# Patient Record
Sex: MALE | Race: WHITE | NOT HISPANIC OR LATINO | Employment: FULL TIME | URBAN - METROPOLITAN AREA
[De-identification: names, ages, dates, MRNs, and addresses within clinical notes are randomized per-mention and may not be internally consistent; named-entity substitution may affect disease eponyms.]

---

## 2021-05-20 ENCOUNTER — APPOINTMENT (EMERGENCY)
Dept: RADIOLOGY | Facility: HOSPITAL | Age: 41
End: 2021-05-20

## 2021-05-20 ENCOUNTER — HOSPITAL ENCOUNTER (EMERGENCY)
Facility: HOSPITAL | Age: 41
Discharge: HOME | End: 2021-05-20
Attending: EMERGENCY MEDICINE | Admitting: EMERGENCY MEDICINE

## 2021-05-20 VITALS
RESPIRATION RATE: 18 BRPM | HEART RATE: 64 BPM | SYSTOLIC BLOOD PRESSURE: 139 MMHG | DIASTOLIC BLOOD PRESSURE: 85 MMHG | OXYGEN SATURATION: 95 %

## 2021-05-20 DIAGNOSIS — S22.31XA CLOSED FRACTURE OF ONE RIB OF RIGHT SIDE, INITIAL ENCOUNTER: Primary | ICD-10-CM

## 2021-05-20 LAB
ALBUMIN SERPL BCP-MCNC: 4 G/DL (ref 3.5–5)
ALP SERPL-CCNC: 57 U/L (ref 46–116)
ALT SERPL W P-5'-P-CCNC: 24 U/L (ref 12–78)
ANION GAP SERPL CALCULATED.3IONS-SCNC: 10 MMOL/L (ref 4–13)
AST SERPL W P-5'-P-CCNC: 13 U/L (ref 5–45)
BASOPHILS # BLD AUTO: 0.03 THOUSANDS/ΜL (ref 0–0.1)
BASOPHILS NFR BLD AUTO: 1 % (ref 0–1)
BILIRUB SERPL-MCNC: 0.54 MG/DL (ref 0.2–1)
BUN SERPL-MCNC: 9 MG/DL (ref 5–25)
CALCIUM SERPL-MCNC: 9.4 MG/DL (ref 8.3–10.1)
CHLORIDE SERPL-SCNC: 108 MMOL/L (ref 100–108)
CO2 SERPL-SCNC: 23 MMOL/L (ref 21–32)
CREAT SERPL-MCNC: 0.89 MG/DL (ref 0.6–1.3)
EOSINOPHIL # BLD AUTO: 0.14 THOUSAND/ΜL (ref 0–0.61)
EOSINOPHIL NFR BLD AUTO: 2 % (ref 0–6)
ERYTHROCYTE [DISTWIDTH] IN BLOOD BY AUTOMATED COUNT: 12.5 % (ref 11.6–15.1)
GFR SERPL CREATININE-BSD FRML MDRD: 107 ML/MIN/1.73SQ M
GLUCOSE SERPL-MCNC: 98 MG/DL (ref 65–140)
HCT VFR BLD AUTO: 47.8 % (ref 36.5–49.3)
HGB BLD-MCNC: 16.5 G/DL (ref 12–17)
IMM GRANULOCYTES # BLD AUTO: 0.01 THOUSAND/UL (ref 0–0.2)
IMM GRANULOCYTES NFR BLD AUTO: 0 % (ref 0–2)
LYMPHOCYTES # BLD AUTO: 1.7 THOUSANDS/ΜL (ref 0.6–4.47)
LYMPHOCYTES NFR BLD AUTO: 26 % (ref 14–44)
MCH RBC QN AUTO: 33.3 PG (ref 26.8–34.3)
MCHC RBC AUTO-ENTMCNC: 34.5 G/DL (ref 31.4–37.4)
MCV RBC AUTO: 97 FL (ref 82–98)
MONOCYTES # BLD AUTO: 0.78 THOUSAND/ΜL (ref 0.17–1.22)
MONOCYTES NFR BLD AUTO: 12 % (ref 4–12)
NEUTROPHILS # BLD AUTO: 3.82 THOUSANDS/ΜL (ref 1.85–7.62)
NEUTS SEG NFR BLD AUTO: 59 % (ref 43–75)
NRBC BLD AUTO-RTO: 0 /100 WBCS
PLATELET # BLD AUTO: 157 THOUSANDS/UL (ref 149–390)
PMV BLD AUTO: 11.1 FL (ref 8.9–12.7)
POTASSIUM SERPL-SCNC: 3.9 MMOL/L (ref 3.5–5.3)
PROT SERPL-MCNC: 7.8 G/DL (ref 6.4–8.2)
RBC # BLD AUTO: 4.95 MILLION/UL (ref 3.88–5.62)
SODIUM SERPL-SCNC: 141 MMOL/L (ref 136–145)
WBC # BLD AUTO: 6.48 THOUSAND/UL (ref 4.31–10.16)

## 2021-05-20 PROCEDURE — 36415 COLL VENOUS BLD VENIPUNCTURE: CPT | Performed by: PHYSICIAN ASSISTANT

## 2021-05-20 PROCEDURE — 71250 CT THORAX DX C-: CPT

## 2021-05-20 PROCEDURE — 99284 EMERGENCY DEPT VISIT MOD MDM: CPT

## 2021-05-20 PROCEDURE — G1004 CDSM NDSC: HCPCS

## 2021-05-20 PROCEDURE — 99284 EMERGENCY DEPT VISIT MOD MDM: CPT | Performed by: PHYSICIAN ASSISTANT

## 2021-05-20 PROCEDURE — 96374 THER/PROPH/DIAG INJ IV PUSH: CPT

## 2021-05-20 PROCEDURE — 85025 COMPLETE CBC W/AUTO DIFF WBC: CPT | Performed by: PHYSICIAN ASSISTANT

## 2021-05-20 PROCEDURE — 80053 COMPREHEN METABOLIC PANEL: CPT | Performed by: PHYSICIAN ASSISTANT

## 2021-05-20 RX ORDER — OXYCODONE HYDROCHLORIDE 5 MG/1
5 TABLET ORAL ONCE
Status: COMPLETED | OUTPATIENT
Start: 2021-05-20 | End: 2021-05-20

## 2021-05-20 RX ORDER — LIDOCAINE 50 MG/G
1 PATCH TOPICAL DAILY
Qty: 30 PATCH | Refills: 0 | Status: SHIPPED | OUTPATIENT
Start: 2021-05-20

## 2021-05-20 RX ORDER — KETOROLAC TROMETHAMINE 30 MG/ML
15 INJECTION, SOLUTION INTRAMUSCULAR; INTRAVENOUS ONCE
Status: COMPLETED | OUTPATIENT
Start: 2021-05-20 | End: 2021-05-20

## 2021-05-20 RX ORDER — ACETAMINOPHEN 325 MG/1
975 TABLET ORAL ONCE
Status: COMPLETED | OUTPATIENT
Start: 2021-05-20 | End: 2021-05-20

## 2021-05-20 RX ORDER — OXYCODONE HYDROCHLORIDE 5 MG/1
5 TABLET ORAL EVERY 6 HOURS PRN
Qty: 8 TABLET | Refills: 0 | Status: SHIPPED | OUTPATIENT
Start: 2021-05-20

## 2021-05-20 RX ORDER — METHOCARBAMOL 750 MG/1
750 TABLET, FILM COATED ORAL ONCE
Status: COMPLETED | OUTPATIENT
Start: 2021-05-20 | End: 2021-05-20

## 2021-05-20 RX ORDER — LIDOCAINE 50 MG/G
1 PATCH TOPICAL ONCE
Status: DISCONTINUED | OUTPATIENT
Start: 2021-05-20 | End: 2021-05-20 | Stop reason: HOSPADM

## 2021-05-20 RX ADMIN — ACETAMINOPHEN 975 MG: 325 TABLET, FILM COATED ORAL at 10:46

## 2021-05-20 RX ADMIN — OXYCODONE HYDROCHLORIDE 5 MG: 5 TABLET ORAL at 11:28

## 2021-05-20 RX ADMIN — KETOROLAC TROMETHAMINE 15 MG: 30 INJECTION, SOLUTION INTRAMUSCULAR at 09:51

## 2021-05-20 RX ADMIN — METHOCARBAMOL TABLETS 750 MG: 750 TABLET, COATED ORAL at 09:52

## 2021-05-20 RX ADMIN — LIDOCAINE 1 PATCH: 50 PATCH TOPICAL at 09:54

## 2021-05-20 NOTE — ED PROVIDER NOTES
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 and felt pain in his right ribs  States that this morning he started coughing again and pain continued and intensified  36 y o  male presents for evaluation of right sided rib pain, has been present for 3 days worse this morning  Notes he works as a  was coughing and felt pain in his ribs  Has been tolerable with tylenol then this morning suffered another coughing fit and felt a pop and has worsening rib pain  Denies SOB, CP, HA, blurry vision, injury, trauma, sick contacts, or known covid exposures  + smoker, +COPD, + marijuana use          Prior to Admission Medications   Prescriptions Last Dose Informant Patient Reported? Taking? Albuterol Sulfate 108 (90 Base) MCG/ACT AEPB   Yes Yes   Sig: Inhale 1 puff every 2 (two) hours as needed      Facility-Administered Medications: None       Past Medical History:   Diagnosis Date    COPD (chronic obstructive pulmonary disease) ([facility] [address] )        History reviewed  No pertinent surgical history  History reviewed  No pertinent family history  I have reviewed and agree with the history as documented  E-Cigarette/Vaping    E-Cigarette Use Never User      E-Cigarette/Vaping Substances     Social History     Tobacco Use    Smoking status: Current Every Day Smoker    Smokeless tobacco: Never Used   Substance Use Topics    Alcohol use: Yes     Frequency: 4 or more times a week     Drinks per session: 5 or 6     Binge frequency: Daily or almost daily    Drug use: Yes     Types: Marijuana       Review of Systems   Respiratory: Positive for cough  Musculoskeletal: Positive for back pain  All other systems reviewed and are negative  Physical Exam  Physical Exam  Vitals signs and nursing note reviewed  Constitutional:       Appearance: Normal appearance  He is normal weight     HENT:      Head: Normocephalic and atraumatic  Right Ear: External ear normal       Left Ear: External ear normal       Nose: Nose normal       Mouth/Throat:      Mouth: Mucous membranes are moist       Pharynx: Oropharynx is clear  Eyes:      Extraocular Movements: Extraocular movements intact  Conjunctiva/sclera: Conjunctivae normal       Pupils: Pupils are equal, round, and reactive to light  Neck:      Musculoskeletal: Normal range of motion and neck supple  Cardiovascular:      Rate and Rhythm: Normal rate and regular rhythm  Pulses: Normal pulses  Heart sounds: Normal heart sounds  Pulmonary:      Effort: Pulmonary effort is normal       Breath sounds: Decreased air movement present  Examination of the right-lower field reveals decreased breath sounds  Examination of the left-lower field reveals decreased breath sounds  Decreased breath sounds present  Musculoskeletal: Normal range of motion  General: Tenderness present  Arms:    Skin:     General: Skin is warm  Capillary Refill: Capillary refill takes less than 2 seconds  Neurological:      General: No focal deficit present  Mental Status: He is alert and oriented to person, place, and time  Mental status is at baseline  Psychiatric:         Mood and Affect: Mood normal          Thought Content:  Thought content normal          Judgment: Judgment normal          Vital Signs  ED Triage Vitals [05/20/21 0931]   Temp Pulse Respirations Blood Pressure SpO2   -- 64 18 139/85 95 %      Temp src Heart Rate Source Patient Position - Orthostatic VS BP Location FiO2 (%)   -- Monitor Sitting Right arm --      Pain Score       Worst Possible Pain           Vitals:    05/20/21 0931   BP: 139/85   Pulse: 64   Patient Position - Orthostatic VS: Sitting         Visual Acuity      ED Medications  Medications   ketorolac (TORADOL) injection 15 mg (15 mg Intravenous Given 5/20/21 0951)   methocarbamol (ROBAXIN) tablet 750 mg (750 mg Oral Given 5/20/21 0952)   acetaminophen (TYLENOL) tablet 975 mg (975 mg Oral Given 5/20/21 1046)   oxyCODONE (ROXICODONE) IR tablet 5 mg (5 mg Oral Given 5/20/21 1128)       Diagnostic Studies  Results Reviewed     Procedure Component Value Units Date/Time    Comprehensive metabolic panel [[id number]] Collected: 05/20/21 0947    Lab Status: Final result Specimen: Blood from Arm, Right Updated: 05/20/21 1029     Sodium 141 mmol/L      Potassium 3 9 mmol/L      Chloride 108 mmol/L      CO2 23 mmol/L      ANION GAP 10 mmol/L      BUN 9 mg/dL      Creatinine 0 89 mg/dL      Glucose 98 mg/dL      Calcium 9 4 mg/dL      AST 13 U/L      ALT 24 U/L      Alkaline Phosphatase 57 U/L      Total Protein 7 8 g/dL      Albumin 4 0 g/dL      Total Bilirubin 0 54 mg/dL      eGFR 107 ml/min/1 73sq m     Narrative:      National Kidney Disease Foundation guidelines for Chronic Kidney Disease (CKD):     Stage 1 with normal or high GFR (GFR > 90 mL/min/1 73 square meters)    Stage 2 Mild CKD (GFR = 60-89 mL/min/1 73 square meters)    Stage 3A Moderate CKD (GFR = 45-59 mL/min/1 73 square meters)    Stage 3B Moderate CKD (GFR = 30-44 mL/min/1 73 square meters)    Stage 4 Severe CKD (GFR = 15-29 mL/min/1 73 square meters)    Stage 5 End Stage CKD (GFR <15 mL/min/1 73 square meters)  Note: GFR calculation is accurate only with a steady state creatinine    CBC and differential [[id number]] Collected: 05/20/21 0947    Lab Status: Final result Specimen: Blood from Arm, Right Updated: 05/20/21 0956     WBC 6 48 Thousand/uL      RBC 4 95 Million/uL      Hemoglobin 16 5 g/dL      Hematocrit 47 8 %      MCV 97 fL      MCH 33 3 pg      MCHC 34 5 g/dL      RDW 12 5 %      MPV 11 1 fL      Platelets 306 Thousands/uL      nRBC 0 /100 WBCs      Neutrophils Relative 59 %      Immat GRANS % 0 %      Lymphocytes Relative 26 %      Monocytes Relative 12 %      Eosinophils Relative 2 %      Basophils Relative 1 %      Neutrophils Absolute 3 82 Thousands/µL Immature Grans Absolute 0 01 Thousand/uL      Lymphocytes Absolute 1 70 Thousands/µL      Monocytes Absolute 0 78 Thousand/µL      Eosinophils Absolute 0 14 Thousand/µL      Basophils Absolute 0 03 Thousands/µL                  CT chest without contrast   Final Result by Nicky Curran MD (05/20 1102)      1  Acute nondisplaced fracture of lateral right 6th rib  Healing subacute fracture of posterolateral right 7th rib  2   No acute airspace disease  Mild biapical emphysema  The study was marked in [facility] for immediate notification  Workstation performed: [id number]                    Procedures  Procedures         ED Course                             SBIRT 20yo+      Most Recent Value   SBIRT (24 yo +)   In order to provide better care to our patients, we are screening all of our patients for alcohol and drug use  Would it be okay to ask you these screening questions? No Filed at: 05/20/2021 2081                    MDM    Disposition  Final diagnoses:   Closed fracture of one rib of right side, initial encounter     Time reflects when diagnosis was documented in both MDM as applicable and the Disposition within this note     Time User Action Codes Description Comment    5/20/2021 11:14 AM Nitesh Portillo Add [S22 31XA] Closed fracture of one rib of right side, initial encounter       ED Disposition     ED Disposition Condition Date/Time Comment    Discharge Stable Thu May 20, 2021 11:16 AM Todd Pitt discharge to home/self care              Follow-up Information     Follow up With Specialties Details Why Contact Info    [employer]    [phone]            Discharge Medication List as of 5/20/2021 11:21 AM      START taking these medications    Details   lidocaine (LIDODERM) 5 % Apply 1 patch topically daily Remove & Discard patch within 12 hours or as directed by MD, Starting Thu 5/20/2021, Normal      oxyCODONE (ROXICODONE) 5 mg immediate release tablet Take 1 tablet (5 mg total) by mouth every 6 (six) hours as needed for moderate pain or severe pain for up to 8 dosesMax Daily Amount: 20 mg, Starting Thu 5/20/2021, Normal         CONTINUE these medications which have NOT CHANGED    Details   Albuterol Sulfate 108 (90 Base) MCG/ACT AEPB Inhale 1 puff every 2 (two) hours as needed, Starting Tue 3/9/2021, Historical Med           No discharge procedures on file      PDMP Review     None          ED Provider  Electronically Signed by           Robert Mascorro PA-C  05/20/21 9303

## 2021-05-20 NOTE — ED NOTES
Pt returned from CT scan and continues to c/o pain to right rb cage   Awaiting ct scan results      Siddharth Rodrigues RN  05/20/21 3553

## 2021-05-20 NOTE — Clinical Note
Renetta Andrew was seen and treated in our emergency department on 5/20/2021  No work until cleared by Family Doctor/Orthopedics        Diagnosis:     Jermain Nichols    He may return on this date: If you have any questions or concerns, please don't hesitate to call        Ric Cline PA-C    ______________________________           _______________          _______________  Hospital Representative                              Date                                Time

## 2022-04-07 ENCOUNTER — OFFICE VISIT (OUTPATIENT)
Dept: FAMILY MEDICINE CLINIC | Facility: CLINIC | Age: 42
End: 2022-04-07

## 2022-04-07 VITALS
WEIGHT: 162.6 LBS | DIASTOLIC BLOOD PRESSURE: 80 MMHG | HEART RATE: 71 BPM | RESPIRATION RATE: 19 BRPM | OXYGEN SATURATION: 92 % | SYSTOLIC BLOOD PRESSURE: 128 MMHG | HEIGHT: 71 IN | BODY MASS INDEX: 22.76 KG/M2 | TEMPERATURE: 98 F

## 2022-04-07 DIAGNOSIS — R79.81 LOW OXYGEN SATURATION: ICD-10-CM

## 2022-04-07 DIAGNOSIS — J45.30 MILD PERSISTENT ASTHMA WITHOUT COMPLICATION: ICD-10-CM

## 2022-04-07 DIAGNOSIS — Z11.4 ENCOUNTER FOR SCREENING FOR HIV: ICD-10-CM

## 2022-04-07 DIAGNOSIS — K21.00 GASTROESOPHAGEAL REFLUX DISEASE WITH ESOPHAGITIS WITHOUT HEMORRHAGE: ICD-10-CM

## 2022-04-07 DIAGNOSIS — L93.0 DISCOID LUPUS: Primary | ICD-10-CM

## 2022-04-07 DIAGNOSIS — Z72.0 TOBACCO USE: ICD-10-CM

## 2022-04-07 DIAGNOSIS — Z11.3 ROUTINE SCREENING FOR STI (SEXUALLY TRANSMITTED INFECTION): ICD-10-CM

## 2022-04-07 DIAGNOSIS — Z11.59 ENCOUNTER FOR HEPATITIS C SCREENING TEST FOR LOW RISK PATIENT: ICD-10-CM

## 2022-04-07 PROCEDURE — 99204 OFFICE O/P NEW MOD 45 MIN: CPT | Performed by: NURSE PRACTITIONER

## 2022-04-07 RX ORDER — TRIAMCINOLONE ACETONIDE 1 MG/G
CREAM TOPICAL 2 TIMES DAILY
Qty: 30 G | Refills: 0 | Status: SHIPPED | OUTPATIENT
Start: 2022-04-07

## 2022-04-07 NOTE — ASSESSMENT & PLAN NOTE
Reports diagnosis at [facility] but has not followed up with Rheumatology or Dermatology  Reports using steroid cream, unsure of name    Start triamcinolone BID, referral to Dermatology

## 2022-04-07 NOTE — PATIENT INSTRUCTIONS
Asthma   WHAT YOU NEED TO KNOW:   Asthma is a lung disease that makes breathing difficult  Chronic inflammation and reactions to triggers narrow the airways in the lungs  Asthma can become life-threatening if it is not managed  DISCHARGE INSTRUCTIONS:   Call your local emergency number (911 in the [address]) if:   · You have severe shortness of breath  · The skin around your neck and ribs pulls in with each breath  · Your peak flow numbers are in the red zone of your AAP  Return to the emergency department if:   · You have shortness of breath, even after you take your short-term medicine as directed  · Your lips or nails turn blue or gray  Call your doctor or asthma specialist if:   · You run out of medicine before your next refill is due  · Your symptoms get worse  · You need to take more medicine than usual to control your symptoms  · You have questions or concerns about your condition or care  Medicines:   · Medicines  may be used to decrease inflammation, open airways, and make it easier to breathe  Medicines may be inhaled, taken as a pill, or injected  Short-term medicines relieve your symptoms quickly  Long-term medicines are used to prevent future asthma attacks  Other medicines may be needed if your regular medicines are not able to prevent attacks  You may also need medicine to help control your allergies  Ask your healthcare provider for more information about any medicine you are given and how to take it safely  · Take your medicine as directed  Contact your healthcare provider if you think your medicine is not helping or if you have side effects  Tell him of her if you are allergic to any medicine  Keep a list of the medicines, vitamins, and herbs you take  Include the amounts, and when and why you take them  Bring the list or the pill bottles to follow-up visits  Carry your medicine list with you in case of an emergency      Manage your symptoms and prevent future attacks: · Follow your Asthma Action Plan (AAP)  This is a written plan that you and your healthcare provider create  It explains which medicine you need and when to change doses if necessary  It also explains how you can monitor symptoms and use a peak flow meter  The meter measures how well your lungs are working  · Manage other health conditions , such as allergies, acid reflux, and sleep apnea  · Identify and avoid triggers  These may include pets, dust mites, mold, and cockroaches  · Do not smoke or be around others who smoke  Nicotine and other chemicals in cigarettes and cigars can cause lung damage  Ask your healthcare provider for information if you currently smoke and need help to quit  E-cigarettes or smokeless tobacco still contain nicotine  Talk to your healthcare provider before you use these products  · Ask about the flu vaccine  The flu can make your asthma worse  You may need a yearly flu shot  Follow up with your healthcare provider as directed: You will need to return to make sure your medicine is working and your symptoms are controlled  You may be referred to an asthma or allergy specialist  Justice Yazmin may be asked to keep a record of your peak flow values and bring it with you to your appointments  Write down your questions so you remember to ask them during your visits  © Copyright [employer] 2022 Information is for End User's use only and may not be sold, redistributed or otherwise used for commercial purposes  All illustrations and images included in CareNotes® are the copyrighted property of A D A M , Inc  or Nick Lopez   The above information is an  only  It is not intended as medical advice for individual conditions or treatments  Talk to your doctor, nurse or pharmacist before following any medical regimen to see if it is safe and effective for you

## 2022-04-07 NOTE — PROGRESS NOTES
Assessment/Plan:    Mild persistent asthma without complication  Reports increased SOB, not using inhalers currently  Low O2 saturation in office 92% RA  Start Advair BID, albuterol PRN  Chest X-ray  Educated to rinse mouth after each use of Advair  Advised to call office if Advair not covered by insurance  Discoid lupus  Reports diagnosis at [facility] but has not followed up with Rheumatology or Dermatology  Reports using steroid cream, unsure of name  Start triamcinolone BID, referral to Dermatology    Willa Chicas was seen today for new patient visit  Diagnoses and all orders for this visit:    Discoid lupus  -     CBC and differential; Future  -     Comprehensive metabolic panel; Future  -     Hemoglobin A1C; Future  -     Lipid panel; Future  -     TSH, 3rd generation with Free T4 reflex; Future  -     Ambulatory Referral to Dermatology; Future  -     triamcinolone (KENALOG) 0 1 % cream; Apply topically 2 (two) times a day  -     RF Screen w/ Reflex to Titer; Future  -     PIPPA Screen w/ Reflex to Titer/Pattern; Future  -     Lupus Anticoagulant Panel; Future  -     Sjogren's Antibodies; Future    Mild persistent asthma without complication  -     fluticasone-salmeterol (Advair Diskus) 100-50 mcg/dose inhaler; Inhale 1 puff 2 (two) times a day Rinse mouth after use  -     Albuterol Sulfate 108 (90 Base) MCG/ACT AEPB; Inhale 1 puff every 2 (two) hours as needed (wheezing)    Low oxygen saturation  -     XR chest pa & lateral; Future    Gastroesophageal reflux disease with esophagitis without hemorrhage  -     Ambulatory Referral to Gastroenterology; Future    Routine screening for STI (sexually transmitted infection)  -     RPR; Future  -     Chlamydia/GC amplified DNA by PCR;  Future    Encounter for screening for HIV  -     HIV 1/2 Antigen/Antibody (4th Generation) w Reflex [facility]; Future    Encounter for hepatitis C screening test for low risk patient  -     Hepatitis C antibody; Future    Tobacco use        Return in about 3 months (around 7/7/2022)  Patient Instructions     Asthma   WHAT YOU NEED TO KNOW:   Asthma is a lung disease that makes breathing difficult  Chronic inflammation and reactions to triggers narrow the airways in the lungs  Asthma can become life-threatening if it is not managed  DISCHARGE INSTRUCTIONS:   Call your local emergency number (911 in the [address]) if:   · You have severe shortness of breath  · The skin around your neck and ribs pulls in with each breath  · Your peak flow numbers are in the red zone of your AAP  Return to the emergency department if:   · You have shortness of breath, even after you take your short-term medicine as directed  · Your lips or nails turn blue or gray  Call your doctor or asthma specialist if:   · You run out of medicine before your next refill is due  · Your symptoms get worse  · You need to take more medicine than usual to control your symptoms  · You have questions or concerns about your condition or care  Medicines:   · Medicines  may be used to decrease inflammation, open airways, and make it easier to breathe  Medicines may be inhaled, taken as a pill, or injected  Short-term medicines relieve your symptoms quickly  Long-term medicines are used to prevent future asthma attacks  Other medicines may be needed if your regular medicines are not able to prevent attacks  You may also need medicine to help control your allergies  Ask your healthcare provider for more information about any medicine you are given and how to take it safely  · Take your medicine as directed  Contact your healthcare provider if you think your medicine is not helping or if you have side effects  Tell him of her if you are allergic to any medicine  Keep a list of the medicines, vitamins, and herbs you take  Include the amounts, and when and why you take them  Bring the list or the pill bottles to follow-up visits   Carry your medicine list with you in case of an emergency  Manage your symptoms and prevent future attacks:   · Follow your Asthma Action Plan (AAP)  This is a written plan that you and your healthcare provider create  It explains which medicine you need and when to change doses if necessary  It also explains how you can monitor symptoms and use a peak flow meter  The meter measures how well your lungs are working  · Manage other health conditions , such as allergies, acid reflux, and sleep apnea  · Identify and avoid triggers  These may include pets, dust mites, mold, and cockroaches  · Do not smoke or be around others who smoke  Nicotine and other chemicals in cigarettes and cigars can cause lung damage  Ask your healthcare provider for information if you currently smoke and need help to quit  E-cigarettes or smokeless tobacco still contain nicotine  Talk to your healthcare provider before you use these products  · Ask about the flu vaccine  The flu can make your asthma worse  You may need a yearly flu shot  Follow up with your healthcare provider as directed: You will need to return to make sure your medicine is working and your symptoms are controlled  You may be referred to an asthma or allergy specialist  Addison White may be asked to keep a record of your peak flow values and bring it with you to your appointments  Write down your questions so you remember to ask them during your visits  © Copyright [employer] 2022 Information is for End User's use only and may not be sold, redistributed or otherwise used for commercial purposes  All illustrations and images included in CareNotes® are the copyrighted property of [employer] A M , Inc  or Nick Lopez   The above information is an  only  It is not intended as medical advice for individual conditions or treatments   Talk to your doctor, nurse or pharmacist before following any medical regimen to see if it is safe and effective for you             Subjective:     Dante Amin is a 39 y o  male who  has a past medical history of COPD (chronic obstructive pulmonary disease) ([facility] [address] )  who presented to the office today to establish care  Patient is frustrated today because he thought that he was coming to a dermatology appointment today  He reports that he has a hx of discoid lupus and mild asthma  Uses tobacco, alcohol, declines drugs  The following portions of the patient's history were reviewed and updated as appropriate: allergies, current medications, past family history, past medical history, past social history, past surgical history and problem list     Current Outpatient Medications on File Prior to Visit   Medication Sig Dispense Refill    lidocaine (LIDODERM) 5 % Apply 1 patch topically daily Remove & Discard patch within 12 hours or as directed by MD 30 patch 0    oxyCODONE (ROXICODONE) 5 mg immediate release tablet Take 1 tablet (5 mg total) by mouth every 6 (six) hours as needed for moderate pain or severe pain for up to 8 dosesMax Daily Amount: 20 mg (Patient not taking: Reported on 4/7/2022 ) 8 tablet 0    [DISCONTINUED] Albuterol Sulfate 108 (90 Base) MCG/ACT AEPB Inhale 1 puff every 2 (two) hours as needed (Patient not taking: Reported on 4/7/2022 )       No current facility-administered medications on file prior to visit  Review of Systems   Constitutional: Negative for chills and fever  HENT: Negative for ear pain and sore throat  Eyes: Negative for pain and visual disturbance  Respiratory: Negative for cough and shortness of breath  Cardiovascular: Negative for chest pain and palpitations  Gastrointestinal: Negative for abdominal pain and vomiting  Genitourinary: Negative for dysuria and hematuria  Musculoskeletal: Negative for arthralgias, back pain and myalgias  Skin: Positive for rash  Negative for color change  Neurological: Negative for seizures and syncope     All other systems reviewed and are negative  Depression Screening and Follow-up Plan: Patient was screened for depression during today's encounter  They screened negative with a PHQ-2 score of 0  Tobacco Cessation Counseling: Tobacco cessation counseling was provided  The patient is sincerely urged to quit consumption of tobacco  He is not ready to quit tobacco         Objective:    /80 (BP Location: Left arm, Patient Position: Sitting, Cuff Size: Standard)   Pulse 71   Temp 98 °F (36 7 °C) (Temporal)   Resp 19   Ht 5' 11" (1 803 m)   Wt 73 8 kg (162 lb 9 6 oz)   SpO2 92%   BMI 22 68 kg/m²     Physical Exam  Vitals and nursing note reviewed  Constitutional:       General: He is not in acute distress  Appearance: He is well-developed  He is not diaphoretic  HENT:      Head: Normocephalic and atraumatic  Right Ear: External ear normal       Left Ear: External ear normal    Eyes:      Pupils: Pupils are equal, round, and reactive to light  Cardiovascular:      Rate and Rhythm: Normal rate and regular rhythm  Pulmonary:      Effort: Pulmonary effort is normal  No respiratory distress  Breath sounds: Decreased breath sounds present  No wheezing  Abdominal:      General: Bowel sounds are normal  There is no distension  Palpations: Abdomen is soft  Tenderness: There is no abdominal tenderness  There is no guarding or rebound  Musculoskeletal:         General: No deformity  Normal range of motion  Cervical back: Normal range of motion and neck supple  Lymphadenopathy:      Cervical: No cervical adenopathy  Skin:     General: Skin is warm and dry  Capillary Refill: Capillary refill takes less than 2 seconds  Findings: Rash present  Rash is scaling  Rash is not crusting  Neurological:      Mental Status: He is alert and oriented to person, place, and time        Deep Tendon Reflexes: Reflexes normal    Psychiatric:         Behavior: Behavior normal          Nasreen Rhodes ALYSSA  04/07/22  5:05 PM

## 2022-04-07 NOTE — ASSESSMENT & PLAN NOTE
Reports increased SOB, not using inhalers currently  Low O2 saturation in office 92% RA  Start Advair BID, albuterol PRN  Chest X-ray  Educated to rinse mouth after each use of Advair  Advised to call office if Advair not covered by insurance

## 2022-04-14 ENCOUNTER — TELEPHONE (OUTPATIENT)
Dept: DERMATOLOGY | Facility: CLINIC | Age: 42
End: 2022-04-14

## 2022-04-14 NOTE — TELEPHONE ENCOUNTER
Pt left v/m wanting to schedule a new pt appt, pt has referral in chart, called 3x and all 3x phone would ring and the call would get  dropped

## 2022-04-26 ENCOUNTER — TELEPHONE (OUTPATIENT)
Dept: FAMILY MEDICINE CLINIC | Facility: CLINIC | Age: 42
End: 2022-04-26

## 2022-04-26 DIAGNOSIS — J45.30 MILD PERSISTENT ASTHMA WITHOUT COMPLICATION: Primary | ICD-10-CM

## 2022-04-26 RX ORDER — ALBUTEROL SULFATE 90 UG/1
2 AEROSOL, METERED RESPIRATORY (INHALATION) EVERY 4 HOURS PRN
Qty: 18 G | Refills: 1 | Status: SHIPPED | OUTPATIENT
Start: 2022-04-26 | End: 2022-04-26 | Stop reason: SDUPTHER

## 2022-04-26 RX ORDER — ALBUTEROL SULFATE 90 UG/1
2 AEROSOL, METERED RESPIRATORY (INHALATION) EVERY 4 HOURS PRN
Qty: 18 G | Refills: 1 | Status: SHIPPED | OUTPATIENT
Start: 2022-04-26

## 2022-04-26 NOTE — TELEPHONE ENCOUNTER
Pt wife called states that the pharmacy told her that Premkimi Delaney advair is not covered and they can not afford it out of pocket and wants to know if there is another inhaler that insurance will cover      Please advise

## 2022-04-26 NOTE — TELEPHONE ENCOUNTER
Per pharmacy, pt does not have insurance  She said they just need a regular Albuterol inhaler sent as the respi-click one is brand and very expensive  Spoke to pt, he states he has an Rx card  I told him to take that card to the pharmacy and have them check on the Advair coverage  I did explain to him that without insurance, it won't matter what alternative we prescribe for the Advair, they all cost hundreds of dollars      If he ends up not having any prescription coverage, will need to refer him to the financial counselors/SW team

## 2022-07-30 ENCOUNTER — APPOINTMENT (EMERGENCY)
Dept: RADIOLOGY | Facility: HOSPITAL | Age: 42
End: 2022-07-30
Payer: COMMERCIAL

## 2022-07-30 ENCOUNTER — HOSPITAL ENCOUNTER (EMERGENCY)
Facility: HOSPITAL | Age: 42
Discharge: HOME | End: 2022-07-30
Attending: EMERGENCY MEDICINE
Payer: COMMERCIAL

## 2022-07-30 VITALS
RESPIRATION RATE: 18 BRPM | OXYGEN SATURATION: 97 % | TEMPERATURE: 98 F | HEART RATE: 67 BPM | DIASTOLIC BLOOD PRESSURE: 68 MMHG | SYSTOLIC BLOOD PRESSURE: 130 MMHG

## 2022-07-30 DIAGNOSIS — J44.1 COPD EXACERBATION (HCC): Primary | ICD-10-CM

## 2022-07-30 LAB
2HR DELTA HS TROPONIN: -2 NG/L
ALBUMIN SERPL BCP-MCNC: 3.8 G/DL (ref 3.5–5)
ALP SERPL-CCNC: 54 U/L (ref 46–116)
ALT SERPL W P-5'-P-CCNC: 20 U/L (ref 12–78)
ANION GAP SERPL CALCULATED.3IONS-SCNC: 3 MMOL/L (ref 4–13)
AST SERPL W P-5'-P-CCNC: 15 U/L (ref 5–45)
BASOPHILS # BLD AUTO: 0.04 THOUSANDS/ΜL (ref 0–0.1)
BASOPHILS NFR BLD AUTO: 1 % (ref 0–1)
BILIRUB SERPL-MCNC: 0.38 MG/DL (ref 0.2–1)
BUN SERPL-MCNC: 8 MG/DL (ref 5–25)
CALCIUM SERPL-MCNC: 9 MG/DL (ref 8.3–10.1)
CARDIAC TROPONIN I PNL SERPL HS: 3 NG/L
CARDIAC TROPONIN I PNL SERPL HS: 5 NG/L
CHLORIDE SERPL-SCNC: 114 MMOL/L (ref 96–108)
CO2 SERPL-SCNC: 26 MMOL/L (ref 21–32)
CREAT SERPL-MCNC: 0.93 MG/DL (ref 0.6–1.3)
D DIMER PPP FEU-MCNC: <0.27 UG/ML FEU
EOSINOPHIL # BLD AUTO: 0.37 THOUSAND/ΜL (ref 0–0.61)
EOSINOPHIL NFR BLD AUTO: 7 % (ref 0–6)
ERYTHROCYTE [DISTWIDTH] IN BLOOD BY AUTOMATED COUNT: 12.6 % (ref 11.6–15.1)
GFR SERPL CREATININE-BSD FRML MDRD: 101 ML/MIN/1.73SQ M
GLUCOSE SERPL-MCNC: 96 MG/DL (ref 65–140)
HCT VFR BLD AUTO: 45.1 % (ref 36.5–49.3)
HGB BLD-MCNC: 15.4 G/DL (ref 12–17)
IMM GRANULOCYTES # BLD AUTO: 0.01 THOUSAND/UL (ref 0–0.2)
IMM GRANULOCYTES NFR BLD AUTO: 0 % (ref 0–2)
LYMPHOCYTES # BLD AUTO: 2.43 THOUSANDS/ΜL (ref 0.6–4.47)
LYMPHOCYTES NFR BLD AUTO: 48 % (ref 14–44)
MCH RBC QN AUTO: 33 PG (ref 26.8–34.3)
MCHC RBC AUTO-ENTMCNC: 34.1 G/DL (ref 31.4–37.4)
MCV RBC AUTO: 97 FL (ref 82–98)
MONOCYTES # BLD AUTO: 0.57 THOUSAND/ΜL (ref 0.17–1.22)
MONOCYTES NFR BLD AUTO: 11 % (ref 4–12)
NEUTROPHILS # BLD AUTO: 1.67 THOUSANDS/ΜL (ref 1.85–7.62)
NEUTS SEG NFR BLD AUTO: 33 % (ref 43–75)
NRBC BLD AUTO-RTO: 0 /100 WBCS
PLATELET # BLD AUTO: 167 THOUSANDS/UL (ref 149–390)
PMV BLD AUTO: 11 FL (ref 8.9–12.7)
POTASSIUM SERPL-SCNC: 4 MMOL/L (ref 3.5–5.3)
PROT SERPL-MCNC: 7.4 G/DL (ref 6.4–8.4)
RBC # BLD AUTO: 4.66 MILLION/UL (ref 3.88–5.62)
SARS-COV-2 RNA RESP QL NAA+PROBE: NEGATIVE
SODIUM SERPL-SCNC: 143 MMOL/L (ref 135–147)
WBC # BLD AUTO: 5.09 THOUSAND/UL (ref 4.31–10.16)

## 2022-07-30 PROCEDURE — U0003 INFECTIOUS AGENT DETECTION BY NUCLEIC ACID (DNA OR RNA); SEVERE ACUTE RESPIRATORY SYNDROME CORONAVIRUS 2 (SARS-COV-2) (CORONAVIRUS DISEASE [COVID-19]), AMPLIFIED PROBE TECHNIQUE, MAKING USE OF HIGH THROUGHPUT TECHNOLOGIES AS DESCRIBED BY CMS-2020-01-R: HCPCS

## 2022-07-30 PROCEDURE — 71045 X-RAY EXAM CHEST 1 VIEW: CPT

## 2022-07-30 PROCEDURE — U0005 INFEC AGEN DETEC AMPLI PROBE: HCPCS

## 2022-07-30 PROCEDURE — 84484 ASSAY OF TROPONIN QUANT: CPT

## 2022-07-30 PROCEDURE — 80053 COMPREHEN METABOLIC PANEL: CPT

## 2022-07-30 PROCEDURE — 93005 ELECTROCARDIOGRAM TRACING: CPT

## 2022-07-30 PROCEDURE — 99285 EMERGENCY DEPT VISIT HI MDM: CPT | Performed by: EMERGENCY MEDICINE

## 2022-07-30 PROCEDURE — 85379 FIBRIN DEGRADATION QUANT: CPT

## 2022-07-30 PROCEDURE — 36415 COLL VENOUS BLD VENIPUNCTURE: CPT

## 2022-07-30 PROCEDURE — 94640 AIRWAY INHALATION TREATMENT: CPT

## 2022-07-30 PROCEDURE — 85025 COMPLETE CBC W/AUTO DIFF WBC: CPT

## 2022-07-30 PROCEDURE — 99285 EMERGENCY DEPT VISIT HI MDM: CPT

## 2022-07-30 RX ORDER — ALBUTEROL SULFATE 2.5 MG/3ML
5 SOLUTION RESPIRATORY (INHALATION) ONCE
Status: COMPLETED | OUTPATIENT
Start: 2022-07-30 | End: 2022-07-30

## 2022-07-30 RX ORDER — ALBUTEROL SULFATE 90 UG/1
2 AEROSOL, METERED RESPIRATORY (INHALATION) ONCE
Status: DISCONTINUED | OUTPATIENT
Start: 2022-07-30 | End: 2022-07-30

## 2022-07-30 RX ORDER — ALBUTEROL SULFATE 90 UG/1
2 AEROSOL, METERED RESPIRATORY (INHALATION) ONCE
Status: COMPLETED | OUTPATIENT
Start: 2022-07-30 | End: 2022-07-30

## 2022-07-30 RX ADMIN — ALBUTEROL SULFATE 5 MG: 2.5 SOLUTION RESPIRATORY (INHALATION) at 09:22

## 2022-07-30 RX ADMIN — ALBUTEROL SULFATE 2 PUFF: 90 AEROSOL, METERED RESPIRATORY (INHALATION) at 11:44

## 2022-07-30 RX ADMIN — IPRATROPIUM BROMIDE 0.5 MG: 0.5 SOLUTION RESPIRATORY (INHALATION) at 09:22

## 2022-07-30 NOTE — Clinical Note
Henna Gomez was seen and treated in our emergency department on 7/30/2022  Diagnosis: viral syndrome    Bryan Zamudio  may return to work on return date  He may return on this date: 08/01/2022         If you have any questions or concerns, please don't hesitate to call        Juan Luis Hawk MD    ______________________________           _______________          _______________  [facility] Representative                              Date                                Time

## 2022-07-30 NOTE — Clinical Note
Rodney Mena was seen and treated in our emergency department on 7/30/2022  Diagnosis: viral syndrome    Jeannie Roblero  may return to work on return date  He may return on this date: 08/01/2022         If you have any questions or concerns, please don't hesitate to call        Radha Philippe MD    ______________________________           _______________          _______________  [facility] Representative                              Date                                Time

## 2022-07-30 NOTE — ED NOTES
Pt updated on plan of care  Bed repositioned per patient request  No needs expressed at this time, call bell in reach       Mallory Moodylyirc, [facility]  37/[id number]/11 [phone]

## 2022-07-30 NOTE — DISCHARGE INSTRUCTIONS
You were seen in the ED for COPD exacerbation  Return to the ED for any worsening symptoms or new symptoms  Follow up with your primary care doctor as soon as possible

## 2022-07-30 NOTE — ED PROVIDER NOTES
History  Chief Complaint   Patient presents with    Shortness of Breath     Pt endorses SOB that has been going on for a few weeks, but got worse last night while pt at work  Endorse CP, worse with cough  Endorses palpitations  +nausea  Recently in contact with sick family members  Denies fevers  No meds today  No recent travel  80-year-old male patient with history of COPD, asthma presenting with shortness of breath, chest pain and cough onset 3 days ago  Patient states that he has been having shortness of breath worsening on exertion  Patient also states that he has midsternal chest pain whenever he coughs  States he has a cough with green sputum  Denies fever, sore throat, abdominal pain, vomiting, diarrhea but admits to nausea  Patient states that he was recently in contact with his niece that was sick  Denies taking anything for his symptoms  States feels like when he had bronchitis in the past           Prior to Admission Medications   Prescriptions Last Dose Informant Patient Reported? Taking? albuterol (PROVENTIL HFA,VENTOLIN HFA) 90 mcg/act inhaler Past Week at Unknown time  No Yes   Sig: Inhale 2 puffs every 4 (four) hours as needed for wheezing or shortness of breath   fluticasone-salmeterol (Advair Diskus) 100-50 mcg/dose inhaler Not Taking at Unknown time  No No   Sig: Inhale 1 puff 2 (two) times a day Rinse mouth after use     Patient not taking: Reported on 7/30/2022   lidocaine (LIDODERM) 5 % Not Taking at Unknown time  No No   Sig: Apply 1 patch topically daily Remove & Discard patch within 12 hours or as directed by MD   Patient not taking: Reported on 7/30/2022   oxyCODONE (ROXICODONE) 5 mg immediate release tablet Not Taking at Unknown time  No No   Sig: Take 1 tablet (5 mg total) by mouth every 6 (six) hours as needed for moderate pain or severe pain for up to 8 dosesMax Daily Amount: 20 mg   Patient not taking: No sig reported   triamcinolone (KENALOG) 0 1 % cream Not Taking at Unknown time  No No   Sig: Apply topically 2 (two) times a day   Patient not taking: Reported on 7/30/2022      Facility-Administered Medications: None       Past Medical History:   Diagnosis Date    COPD (chronic obstructive pulmonary disease) ([facility] [address] )        Past Surgical History:   Procedure Laterality Date    CARDIAC SURGERY      HEMORRHOID SURGERY  1988       Family History   Problem Relation Age of Onset    Lupus Mother     Heart disease Maternal Grandmother      I have reviewed and agree with the history as documented  E-Cigarette/Vaping    E-Cigarette Use Never User      E-Cigarette/Vaping Substances     Social History     Tobacco Use    Smoking status: Current Every Day Smoker    Smokeless tobacco: Never Used   Vaping Use    Vaping Use: Never used   Substance Use Topics    Alcohol use: Yes    Drug use: Yes     Types: Marijuana        Review of Systems   Constitutional: Negative for chills and fever  HENT: Negative for congestion, rhinorrhea and sore throat  Respiratory: Positive for cough and shortness of breath  Negative for chest tightness  Cardiovascular: Positive for chest pain  Negative for leg swelling  Gastrointestinal: Negative for abdominal pain, diarrhea, nausea and vomiting  Genitourinary: Negative for difficulty urinating and dysuria  Musculoskeletal: Negative for arthralgias, back pain and myalgias  Skin: Negative for rash and wound  Neurological: Negative for dizziness and headaches  All other systems reviewed and are negative        Physical Exam  ED Triage Vitals [07/30/22 0814]   Temperature Pulse Respirations Blood Pressure SpO2   98 °F (36 7 °C) 66 16 130/68 96 %      Temp Source Heart Rate Source Patient Position - Orthostatic VS BP Location FiO2 (%)   Oral Monitor Sitting Left arm --      Pain Score       6             Orthostatic Vital Signs  Vitals:    07/30/22 0814 07/30/22 0900 07/30/22 0924 07/30/22 1015   BP: 130/68 130/68 130/68 130/68   Pulse: 66 59 66 67   Patient Position - Orthostatic VS: Sitting Sitting  Sitting       Physical Exam  Vitals reviewed  Constitutional:       Appearance: Normal appearance  HENT:      Head: Normocephalic and atraumatic  Nose: Nose normal       Mouth/Throat:      Mouth: Mucous membranes are moist       Pharynx: Oropharynx is clear  Eyes:      Extraocular Movements: Extraocular movements intact  Conjunctiva/sclera: Conjunctivae normal    Cardiovascular:      Rate and Rhythm: Normal rate and regular rhythm  Pulses: Normal pulses  Heart sounds: Normal heart sounds  Pulmonary:      Effort: Pulmonary effort is normal       Breath sounds: Normal breath sounds  Abdominal:      General: Bowel sounds are normal       Palpations: Abdomen is soft  Tenderness: There is no abdominal tenderness  Musculoskeletal:         General: Normal range of motion  Cervical back: Normal range of motion  Right lower leg: No tenderness  No edema  Left lower leg: Tenderness (Left calf tenderness, positive Homans sign) present  No edema  Skin:     General: Skin is warm and dry  Neurological:      General: No focal deficit present  Mental Status: He is alert and oriented to person, place, and time  Mental status is at baseline           ED Medications  Medications   albuterol inhalation solution 5 mg (5 mg Nebulization Given 7/30/22 0922)   ipratropium (ATROVENT) 0 02 % inhalation solution 0 5 mg (0 5 mg Nebulization Given 7/30/22 0922)   albuterol (PROVENTIL HFA,VENTOLIN HFA) inhaler 2 puff (2 puffs Inhalation Given 7/30/22 1144)       Diagnostic Studies  Results Reviewed     Procedure Component Value Units Date/Time    HS Troponin I 2hr [[id number]]  (Normal) Collected: 07/30/22 1050    Lab Status: Final result Specimen: Blood from Arm, Right Updated: 07/30/22 1137     hs TnI 2hr 3 ng/L      Delta 2hr hsTnI -2 ng/L     COVID only [[id number]]  (Normal) Collected: 07/30/22 0852    Lab Status: Final result Specimen: Nares from Nose Updated: 07/30/22 1004     SARS-CoV-2 Negative    Narrative:      FOR PEDIATRIC PATIENTS - copy/paste COVID Guidelines URL to browser: [email or web address]/  [employer]    SARS-CoV-2 assay is a Nucleic Acid Amplification assay intended for the  qualitative detection of nucleic acid from SARS-CoV-2 in nasopharyngeal  swabs  Results are for the presumptive identification of SARS-CoV-2 RNA  Positive results are indicative of infection with SARS-CoV-2, the virus  causing COVID-19, but do not rule out bacterial infection or co-infection  with other viruses  Laboratories within the [place] and its  territories are required to report all positive results to the appropriate  public health authorities  Negative results do not preclude SARS-CoV-2  infection and should not be used as the sole basis for treatment or other  patient management decisions  Negative results must be combined with  clinical observations, patient history, and epidemiological information  This test has not been FDA cleared or approved  This test has been authorized by FDA under an Emergency Use Authorization  (EUA)  This test is only authorized for the duration of time the  declaration that circumstances exist justifying the authorization of the  emergency use of an in vitro diagnostic tests for detection of SARS-CoV-2  virus and/or diagnosis of COVID-19 infection under section 564(b)(1) of  the Act, 21 U  S C  619JTB-5(U)(2), unless the authorization is terminated  or revoked sooner  The test has been validated but independent review by FDA  and CLIA is pending  Test performed using [employer] GeneXpert: This RT-PCR assay targets N2,  a region unique to SARS-CoV-2  A conserved region in the E-gene was chosen  for pan-Sarbecovirus detection which includes SARS-CoV-2      HS Troponin 0hr (reflex protocol) [[id number]]  (Normal) Collected: 07/30/22 0839    Lab Status: Final result Specimen: Blood from Arm, Right Updated: 07/30/22 0924     hs TnI 0hr 5 ng/L     D-dimer, quantitative [[id number]]  (Normal) Collected: 07/30/22 0839    Lab Status: Final result Specimen: Blood from Arm, Right Updated: 07/30/22 0924     D-Dimer, Quant <0 27 ug/ml FEU     Comprehensive metabolic panel [[id number]]  (Abnormal) Collected: 07/30/22 0839    Lab Status: Final result Specimen: Blood from Arm, Right Updated: 07/30/22 0910     Sodium 143 mmol/L      Potassium 4 0 mmol/L      Chloride 114 mmol/L      CO2 26 mmol/L      ANION GAP 3 mmol/L      BUN 8 mg/dL      Creatinine 0 93 mg/dL      Glucose 96 mg/dL      Calcium 9 0 mg/dL      AST 15 U/L      ALT 20 U/L      Alkaline Phosphatase 54 U/L      Total Protein 7 4 g/dL      Albumin 3 8 g/dL      Total Bilirubin 0 38 mg/dL      eGFR 101 ml/min/1 73sq m     Narrative:      National Kidney Disease Foundation guidelines for Chronic Kidney Disease (CKD):     Stage 1 with normal or high GFR (GFR > 90 mL/min/1 73 square meters)    Stage 2 Mild CKD (GFR = 60-89 mL/min/1 73 square meters)    Stage 3A Moderate CKD (GFR = 45-59 mL/min/1 73 square meters)    Stage 3B Moderate CKD (GFR = 30-44 mL/min/1 73 square meters)    Stage 4 Severe CKD (GFR = 15-29 mL/min/1 73 square meters)    Stage 5 End Stage CKD (GFR <15 mL/min/1 73 square meters)  Note: GFR calculation is accurate only with a steady state creatinine    CBC and differential [[id number]]  (Abnormal) Collected: 07/30/22 0839    Lab Status: Final result Specimen: Blood from Arm, Right Updated: 07/30/22 0855     WBC 5 09 Thousand/uL      RBC 4 66 Million/uL      Hemoglobin 15 4 g/dL      Hematocrit 45 1 %      MCV 97 fL      MCH 33 0 pg      MCHC 34 1 g/dL      RDW 12 6 %      MPV 11 0 fL      Platelets 129 Thousands/uL      nRBC 0 /100 WBCs      Neutrophils Relative 33 %      Immat GRANS % 0 %      Lymphocytes Relative 48 %      Monocytes Relative 11 %      Eosinophils Relative 7 %      Basophils Relative 1 %      Neutrophils Absolute 1 67 Thousands/µL      Immature Grans Absolute 0 01 Thousand/uL      Lymphocytes Absolute 2 43 Thousands/µL      Monocytes Absolute 0 57 Thousand/µL      Eosinophils Absolute 0 37 Thousand/µL      Basophils Absolute 0 04 Thousands/µL                  XR chest 1 view portable   ED Interpretation by Cari Gutiérrez MD (07/30 0920)   No acute cardiopulm disease, COPD      Final Result by Matthew Velasco MD (07/30 1018)      No acute cardiopulmonary disease  Workstation performed: [id number]               Procedures  Procedures      ED Course  ED Course as of 08/01/22 1107   Sat Jul 30, 2022   0855 EKG: sinus bradycardia  Left posterior fascicular block  T wave inversions in anterior lead                  HEART Risk Score    Flowsheet Row Most Recent Value   Heart Score Risk Calculator    History 0 Filed at: 07/30/2022 1138   ECG 0 Filed at: 07/30/2022 1138   Age 0 Filed at: 07/30/2022 1138   Risk Factors 1 Filed at: 07/30/2022 1138   Troponin 0 Filed at: 07/30/2022 1138   HEART Score 1 Filed at: 07/30/2022 1138              PERC Rule for PE    Flowsheet Row Most Recent Value   PERC Rule for PE    Age >=50 0 Filed at: 07/30/2022 0825   HR >=100 0 Filed at: 07/30/2022 0825   O2 Sat on room air < 95% 1 Filed at: 07/30/2022 0825   History of PE or DVT 0 Filed at: 07/30/2022 0825   Recent trauma or surgery 0 Filed at: 07/30/2022 0825   Hemoptysis 0 Filed at: 07/30/2022 0825   Exogenous estrogen 0 Filed at: 07/30/2022 0825   Unilateral leg swelling 0 Filed at: 07/30/2022 0825   PERC Rule for PE Results 1 Filed at: 07/30/2022 3751                  Wells' Criteria for PE    Flowsheet Row Most Recent Value   Wells' Criteria for PE    Clinical signs and symptoms of DVT 0 Filed at: 07/30/2022 8534   PE is primary diagnosis or equally likely 0 Filed at: 07/30/2022 0823   HR >100 0 Filed at: 07/30/2022 0823   Immobilization at least 3 days or Surgery in the previous 4 weeks 0 Filed at: 07/30/2022 9089   Previous, objectively diagnosed PE or DVT 0 Filed at: 07/30/2022 4246   Hemoptysis 0 Filed at: 07/30/2022 7617   Malignancy with treatment within 6 months or palliative 0 Filed at: 07/30/2022 7555   Wells' Criteria Total 0 Filed at: 07/30/2022 3095            [facility]  Number of Diagnoses or Management Options  [facility]  Diagnosis management comments: 80-year-old male patient with history of COPD presenting with shortness of breath, cough, chest pain when he coughs  Exam shows left calf tenderness but lungs clear to auscultation  Likely COPD exacerbation secondary to viral syndrome  Patient's labs within normal limits, negative troponin  Negative D-dimer  Patient treated with DuoNeb with improvement of symptoms  Stable for discharge with albuterol inhaler  Return precautions given  Amount and/or Complexity of Data Reviewed  Clinical lab tests: ordered and reviewed  Tests in the radiology section of CPT®: ordered and reviewed        Disposition  Final diagnoses:   COPD exacerbation ([facility] )     Time reflects when diagnosis was documented in both MDM as applicable and the Disposition within this note     Time User Action Codes Description Comment    7/30/2022 11:38 AM Rafael Trammell Add [J44 1] COPD exacerbation [facility])       ED Disposition     ED Disposition   Discharge    Condition   Stable    Date/Time   Sat Jul 30, 2022 11:38 AM    Comment   [address] discharge to home/self care                 Follow-up Information     Follow up With Specialties Details Why Contact Info    Kiara Griffin, [address], Nurse Practitioner Schedule an appointment as soon as possible for a visit   [facility] [address] [facility] [address]  [phone]            Discharge Medication List as of 7/30/2022 11:40 AM      CONTINUE these medications which have NOT CHANGED    Details   albuterol (PROVENTIL HFA,VENTOLIN HFA) 90 mcg/act inhaler Inhale 2 puffs every 4 (four) hours as needed for wheezing or shortness of breath, Starting Tue 4/26/2022, Normal      fluticasone-salmeterol (Advair Diskus) 100-50 mcg/dose inhaler Inhale 1 puff 2 (two) times a day Rinse mouth after use , Starting Thu 4/7/2022, Until Tue 10/4/2022, Normal      lidocaine (LIDODERM) 5 % Apply 1 patch topically daily Remove & Discard patch within 12 hours or as directed by MD, Starting Thu 5/20/2021, Normal      oxyCODONE (ROXICODONE) 5 mg immediate release tablet Take 1 tablet (5 mg total) by mouth every 6 (six) hours as needed for moderate pain or severe pain for up to 8 dosesMax Daily Amount: 20 mg, Starting Thu 5/20/2021, Normal      triamcinolone (KENALOG) 0 1 % cream Apply topically 2 (two) times a day, Starting Thu 4/7/2022, Normal           No discharge procedures on file  PDMP Review     None           ED Provider  Attending physically available and evaluated Davis Cea  I managed the patient along with the ED Attending      Electronically Signed by         Vida Gloria MD  08/01/22 7126

## 2022-07-30 NOTE — Clinical Note
Heather Morrison was seen and treated in our emergency department on 7/30/2022  Diagnosis:     Aga Benson  may return to school on return date  He may return on this date: If you have any questions or concerns, please don't hesitate to call        Marta Solis MD    ______________________________           _______________          _______________  Hospital Representative                              Date                                Time

## 2022-07-30 NOTE — ED NOTES
Resting with eyes closed, rouses to voice  No needs expressed at this time  Call dane in anabella Mascorro RN  07/30/22 1024

## 2022-07-30 NOTE — ED NOTES
Nad  No needs expressed at this time  Family provided with coffee  RN recommends pt to remain NPO at this time, pt verbalized understanding       [place]  07/30/22 6025

## 2022-07-31 LAB
ATRIAL RATE: 59 BPM
PR INTERVAL: 146 MS
QRS AXIS: 123 DEGREES
QRSD INTERVAL: 100 MS
QT INTERVAL: 450 MS
QTC INTERVAL: 445 MS
T WAVE AXIS: 122 DEGREES
VENTRICULAR RATE: 59 BPM

## 2022-07-31 PROCEDURE — 93010 ELECTROCARDIOGRAM REPORT: CPT | Performed by: INTERNAL MEDICINE

## 2022-07-31 NOTE — ED ATTENDING ATTESTATION
7/30/2022  IKiki MD, saw and evaluated the patient  I have discussed the patient with the resident/non-physician practitioner and agree with the resident's/non-physician practitioner's findings, Plan of Care, and MDM as documented in the resident's/non-physician practitioner's note, except where noted  All available labs and Radiology studies were reviewed  I was present for key portions of any procedure(s) performed by the resident/non-physician practitioner and I was immediately available to provide assistance  At this point I agree with the current assessment done in the Emergency Department  I have conducted an independent evaluation of this patient a history and physical is as follows:    ED Course    22-year-old male presenting with shortness of breath recent cough and nausea  Recent sick family contacts history of asthma out of albuterol  Vital signs reviewed heart regular rate rhythm without murmurs  Lungs clear to auscultation bilaterally abdomen soft nontender nondistended normal bowel sounds extremities no edema  Impression dyspnea with cough plan to check cardiac evaluation chest x-ray ECG D-dimer to trial bronchodilators    Lab studies reviewed patient reassessed will discharge patient home    Follow-up PCP as outpatient  Critical Care Time  Procedures

## 2022-08-01 NOTE — ED RE-EVALUATION NOTE
Patient's significant other called emergency department informing us that he was feeling worse, I reviewed chart and labs with her,  I advised them to seek medical attention for repeat evaluation, she understands and agrees to do so        Lisset Bowers PA-C  08/01/22 8722

## 2023-05-09 ENCOUNTER — APPOINTMENT (EMERGENCY)
Dept: RADIOLOGY | Facility: HOSPITAL | Age: 43
End: 2023-05-09

## 2023-05-09 ENCOUNTER — HOSPITAL ENCOUNTER (INPATIENT)
Facility: HOSPITAL | Age: 43
LOS: 1 days | Discharge: HOME | End: 2023-05-10
Attending: EMERGENCY MEDICINE | Admitting: INTERNAL MEDICINE

## 2023-05-09 DIAGNOSIS — K92.1 MELENA: ICD-10-CM

## 2023-05-09 DIAGNOSIS — F10.139 ALCOHOL ABUSE WITH WITHDRAWAL (HCC): ICD-10-CM

## 2023-05-09 DIAGNOSIS — R10.9 ABDOMINAL PAIN: ICD-10-CM

## 2023-05-09 DIAGNOSIS — K92.2 GI BLEED: Primary | ICD-10-CM

## 2023-05-09 DIAGNOSIS — F10.939 ALCOHOL WITHDRAWAL (HCC): ICD-10-CM

## 2023-05-09 PROBLEM — F17.210 CIGARETTE NICOTINE DEPENDENCE WITHOUT COMPLICATION: Status: ACTIVE | Noted: 2021-03-09

## 2023-05-09 LAB
ABO GROUP BLD: NORMAL
ABO GROUP BLD: NORMAL
ALBUMIN SERPL BCP-MCNC: 3.9 G/DL (ref 3.5–5)
ALP SERPL-CCNC: 63 U/L (ref 46–116)
ALT SERPL W P-5'-P-CCNC: 30 U/L (ref 12–78)
ANION GAP SERPL CALCULATED.3IONS-SCNC: 2 MMOL/L (ref 4–13)
AST SERPL W P-5'-P-CCNC: 23 U/L (ref 5–45)
ATRIAL RATE: 69 BPM
BASOPHILS # BLD AUTO: 0.03 THOUSANDS/ÂΜL (ref 0–0.1)
BASOPHILS NFR BLD AUTO: 1 % (ref 0–1)
BILIRUB SERPL-MCNC: 0.53 MG/DL (ref 0.2–1)
BLD GP AB SCN SERPL QL: NEGATIVE
BUN SERPL-MCNC: 7 MG/DL (ref 5–25)
CALCIUM SERPL-MCNC: 9.2 MG/DL (ref 8.3–10.1)
CHLORIDE SERPL-SCNC: 110 MMOL/L (ref 96–108)
CO2 SERPL-SCNC: 25 MMOL/L (ref 21–32)
CREAT SERPL-MCNC: 0.78 MG/DL (ref 0.6–1.3)
EOSINOPHIL # BLD AUTO: 0.09 THOUSAND/ÂΜL (ref 0–0.61)
EOSINOPHIL NFR BLD AUTO: 2 % (ref 0–6)
ERYTHROCYTE [DISTWIDTH] IN BLOOD BY AUTOMATED COUNT: 12.7 % (ref 11.6–15.1)
GFR SERPL CREATININE-BSD FRML MDRD: 111 ML/MIN/1.73SQ M
GLUCOSE SERPL-MCNC: 91 MG/DL (ref 65–140)
HCT VFR BLD AUTO: 47.1 % (ref 36.5–49.3)
HGB BLD-MCNC: 16 G/DL (ref 12–17)
IMM GRANULOCYTES # BLD AUTO: 0.01 THOUSAND/UL (ref 0–0.2)
IMM GRANULOCYTES NFR BLD AUTO: 0 % (ref 0–2)
INR PPP: 0.88 (ref 0.84–1.19)
LIPASE SERPL-CCNC: 65 U/L (ref 73–393)
LYMPHOCYTES # BLD AUTO: 1.88 THOUSANDS/ÂΜL (ref 0.6–4.47)
LYMPHOCYTES NFR BLD AUTO: 43 % (ref 14–44)
MCH RBC QN AUTO: 32.2 PG (ref 26.8–34.3)
MCHC RBC AUTO-ENTMCNC: 34 G/DL (ref 31.4–37.4)
MCV RBC AUTO: 95 FL (ref 82–98)
MONOCYTES # BLD AUTO: 0.44 THOUSAND/ÂΜL (ref 0.17–1.22)
MONOCYTES NFR BLD AUTO: 10 % (ref 4–12)
NEUTROPHILS # BLD AUTO: 1.95 THOUSANDS/ÂΜL (ref 1.85–7.62)
NEUTS SEG NFR BLD AUTO: 44 % (ref 43–75)
NRBC BLD AUTO-RTO: 0 /100 WBCS
PLATELET # BLD AUTO: 132 THOUSANDS/UL (ref 149–390)
PMV BLD AUTO: 11 FL (ref 8.9–12.7)
POTASSIUM SERPL-SCNC: 3.8 MMOL/L (ref 3.5–5.3)
PR INTERVAL: 138 MS
PROT SERPL-MCNC: 7.6 G/DL (ref 6.4–8.4)
PROTHROMBIN TIME: 12.2 SECONDS (ref 11.6–14.5)
QRS AXIS: 131 DEGREES
QRSD INTERVAL: 92 MS
QT INTERVAL: 422 MS
QTC INTERVAL: 452 MS
RBC # BLD AUTO: 4.97 MILLION/UL (ref 3.88–5.62)
RH BLD: POSITIVE
RH BLD: POSITIVE
SODIUM SERPL-SCNC: 137 MMOL/L (ref 135–147)
SPECIMEN EXPIRATION DATE: NORMAL
T WAVE AXIS: 160 DEGREES
VENTRICULAR RATE: 69 BPM
WBC # BLD AUTO: 4.4 THOUSAND/UL (ref 4.31–10.16)

## 2023-05-09 RX ORDER — NICOTINE 21 MG/24HR
21 PATCH, TRANSDERMAL 24 HOURS TRANSDERMAL ONCE
Status: DISCONTINUED | OUTPATIENT
Start: 2023-05-09 | End: 2023-05-10 | Stop reason: HOSPADM

## 2023-05-09 RX ORDER — LORAZEPAM 2 MG/ML
1 INJECTION INTRAMUSCULAR ONCE
Status: COMPLETED | OUTPATIENT
Start: 2023-05-09 | End: 2023-05-09

## 2023-05-09 RX ORDER — PANTOPRAZOLE SODIUM 40 MG/10ML
40 INJECTION, POWDER, LYOPHILIZED, FOR SOLUTION INTRAVENOUS ONCE
Status: COMPLETED | OUTPATIENT
Start: 2023-05-09 | End: 2023-05-09

## 2023-05-09 RX ORDER — FOLIC ACID 1 MG/1
1 TABLET ORAL DAILY
Status: DISCONTINUED | OUTPATIENT
Start: 2023-05-09 | End: 2023-05-10 | Stop reason: HOSPADM

## 2023-05-09 RX ORDER — LANOLIN ALCOHOL/MO/W.PET/CERES
100 CREAM (GRAM) TOPICAL DAILY
Status: DISCONTINUED | OUTPATIENT
Start: 2023-05-09 | End: 2023-05-10 | Stop reason: HOSPADM

## 2023-05-09 RX ORDER — ONDANSETRON 2 MG/ML
4 INJECTION INTRAMUSCULAR; INTRAVENOUS ONCE
Status: COMPLETED | OUTPATIENT
Start: 2023-05-09 | End: 2023-05-09

## 2023-05-09 RX ORDER — LORAZEPAM 1 MG/1
2 TABLET ORAL ONCE
Status: COMPLETED | OUTPATIENT
Start: 2023-05-09 | End: 2023-05-09

## 2023-05-09 RX ORDER — SUCRALFATE 1 G/1
1 TABLET ORAL ONCE
Status: COMPLETED | OUTPATIENT
Start: 2023-05-09 | End: 2023-05-09

## 2023-05-09 RX ADMIN — SUCRALFATE 1 G: 1 TABLET ORAL at 15:35

## 2023-05-09 RX ADMIN — PANTOPRAZOLE SODIUM 40 MG: 40 INJECTION, POWDER, FOR SOLUTION INTRAVENOUS at 15:35

## 2023-05-09 RX ADMIN — IOHEXOL 100 ML: 350 INJECTION, SOLUTION INTRAVENOUS at 17:23

## 2023-05-09 RX ADMIN — LORAZEPAM 2 MG: 1 TABLET ORAL at 21:38

## 2023-05-09 RX ADMIN — LORAZEPAM 1 MG: 2 INJECTION INTRAMUSCULAR; INTRAVENOUS at 17:02

## 2023-05-09 RX ADMIN — FOLIC ACID 1 MG: 1 TABLET ORAL at 15:35

## 2023-05-09 RX ADMIN — Medication 1 TABLET: at 15:35

## 2023-05-09 RX ADMIN — NICOTINE 21 MG: 21 PATCH, EXTENDED RELEASE TRANSDERMAL at 19:32

## 2023-05-09 RX ADMIN — THIAMINE HCL TAB 100 MG 100 MG: 100 TAB at 15:35

## 2023-05-09 RX ADMIN — ONDANSETRON 4 MG: 2 INJECTION INTRAMUSCULAR; INTRAVENOUS at 15:36

## 2023-05-09 NOTE — ED PROVIDER NOTES
History  Chief Complaint   Patient presents with   • Black or Bloody Stool     Pt began with dark stool about a month ago  Also having abdominal pain  Noted epigastric pain   + nausea, increases with eating  Patient is a 66-year-old male, with a history significant for COPD, lupus, alcoholism, who presents to the ED today due to a one month history of melena  There is associated nausea, nonbloody vomiting, 25 pound weight loss, and epigastric/right upper quadrant pain that radiates to the back over this period of time  This discomfort is exacerbated by eating and there are no clear remitting factors  Patient denies chest pain, dyspnea, fever, urinary symptoms, weakness, numbness  Notably, patient drinks one half of a fifth of liquor daily  Due to the pain, patient states that he stopped drinking as heavily 2 weeks ago but he has continued to drink over that 2-week  Patient states that he has had alcohol withdrawal before and he does not feel as though he has withdrawal symptoms at this time  Patient is interested in discontinuing alcohol  Patient is without other concerns at this time  Prior to Admission Medications   Prescriptions Last Dose Informant Patient Reported? Taking? albuterol (PROVENTIL HFA,VENTOLIN HFA) 90 mcg/act inhaler   No No   Sig: Inhale 2 puffs every 4 (four) hours as needed for wheezing or shortness of breath   fluticasone-salmeterol (Advair Diskus) 100-50 mcg/dose inhaler   No No   Sig: Inhale 1 puff 2 (two) times a day Rinse mouth after use     Patient not taking: Reported on 7/30/2022   lidocaine (LIDODERM) 5 %   No No   Sig: Apply 1 patch topically daily Remove & Discard patch within 12 hours or as directed by MD   Patient not taking: Reported on 7/30/2022   oxyCODONE (ROXICODONE) 5 mg immediate release tablet   No No   Sig: Take 1 tablet (5 mg total) by mouth every 6 (six) hours as needed for moderate pain or severe pain for up to 8 dosesMax Daily Amount: 20 mg Patient not taking: No sig reported   triamcinolone (KENALOG) 0 1 % cream   No No   Sig: Apply topically 2 (two) times a day   Patient not taking: Reported on 7/30/2022      Facility-Administered Medications: None       Past Medical History:   Diagnosis Date   • COPD (chronic obstructive pulmonary disease) ([facility] [address] )        Past Surgical History:   Procedure Laterality Date   • CARDIAC SURGERY     • HEMORRHOID SURGERY  1988       Family History   Problem Relation Age of Onset   • Lupus Mother    • Heart disease Maternal Grandmother      I have reviewed and agree with the history as documented  E-Cigarette/Vaping   • E-Cigarette Use Never User      E-Cigarette/Vaping Substances     Social History     Tobacco Use   • Smoking status: Every Day   • Smokeless tobacco: Never   Vaping Use   • Vaping Use: Never used   Substance Use Topics   • Alcohol use: Yes   • Drug use: Yes     Types: Marijuana        Review of Systems   Constitutional: Positive for unexpected weight change  Negative for fever  HENT: Negative for trouble swallowing  Eyes: Negative for visual disturbance  Respiratory: Negative for shortness of breath  Cardiovascular: Negative for chest pain  Gastrointestinal: Positive for abdominal pain, blood in stool, nausea and vomiting  Endocrine: Negative for polyuria  Genitourinary: Negative for dysuria  Musculoskeletal: Negative for gait problem  Skin: Positive for rash  Allergic/Immunologic: Negative for environmental allergies  Neurological: Negative for weakness and numbness  Hematological: Negative for adenopathy  Psychiatric/Behavioral: Negative for confusion  All other systems reviewed and are negative        Physical Exam  ED Triage Vitals   Temperature Pulse Respirations Blood Pressure SpO2   05/09/23 1408 05/09/23 1410 05/09/23 1410 05/09/23 1410 05/09/23 1410   98 5 °F (36 9 °C) 68 18 130/78 94 %      Temp Source Heart Rate Source Patient Position - Orthostatic VS BP Location FiO2 (%)   05/09/23 1408 -- -- -- --   Temporal          Pain Score       05/09/23 1410       5             Orthostatic Vital Signs  Vitals:    05/09/23 1410   BP: 130/78   Pulse: 68       Physical Exam  Vitals and nursing note reviewed  Exam conducted with a chaperone present  Constitutional:       General: He is not in acute distress  Appearance: He is not toxic-appearing or diaphoretic  HENT:      Head: Normocephalic and atraumatic  Right Ear: External ear normal       Left Ear: External ear normal       Nose: Nose normal  No rhinorrhea  Mouth/Throat:      Mouth: Mucous membranes are moist       Pharynx: Oropharynx is clear  No oropharyngeal exudate or posterior oropharyngeal erythema  Comments: Uvula midline  No oropharyngeal or submandibular mass/swelling  Eyes:      General: No scleral icterus  Right eye: No discharge  Left eye: No discharge  Conjunctiva/sclera: Conjunctivae normal       Pupils: Pupils are equal, round, and reactive to light  Neck:      Comments: Patient is spontaneously rotating their neck to the left and right during the history and physical exam interaction without difficulty or apparent discomfort    Cardiovascular:      Rate and Rhythm: Normal rate and regular rhythm  Pulses: Normal pulses  Heart sounds: Normal heart sounds  No murmur heard  No friction rub  No gallop  Comments: 2+ Radial  Pulmonary:      Effort: Pulmonary effort is normal  No respiratory distress  Breath sounds: Normal breath sounds  No stridor  No wheezing, rhonchi or rales  Abdominal:      General: Abdomen is flat  There is no distension  Palpations: Abdomen is soft  Tenderness: There is abdominal tenderness (Epigastric and RUQ)  There is no right CVA tenderness, left CVA tenderness, guarding or rebound  Genitourinary:     Rectum: Guaiac result positive  Musculoskeletal:         General: No deformity  Cervical back: Neck supple   No tenderness  No muscular tenderness  Lymphadenopathy:      Cervical: No cervical adenopathy  Skin:     General: Skin is warm and dry  Capillary Refill: Capillary refill takes less than 2 seconds  Findings: Rash (Discoid rash ) present  Neurological:      Mental Status: He is alert  Comments: Patient is speaking clearly in complete sentences  Patient is answering appropriately and able follow commands  Patient is moving all four extremities spontaneously  No facial droop  Tongue midline        Psychiatric:         Mood and Affect: Mood normal          Behavior: Behavior normal          ED Medications  Medications   thiamine tablet 100 mg (100 mg Oral Given 9/5/16 1782)   folic acid (FOLVITE) tablet 1 mg (1 mg Oral Given 5/9/23 1535)   multivitamin-minerals (CENTRUM) tablet 1 tablet (1 tablet Oral Given 5/9/23 1535)   LORazepam (ATIVAN) injection 1 mg (has no administration in time range)   pantoprazole (PROTONIX) injection 40 mg (40 mg Intravenous Given 5/9/23 1535)   ondansetron (ZOFRAN) injection 4 mg (4 mg Intravenous Given 5/9/23 1536)   sucralfate (CARAFATE) tablet 1 g (1 g Oral Given 5/9/23 1535)       Diagnostic Studies  Results Reviewed     Procedure Component Value Units Date/Time    Comprehensive metabolic panel [[id number]]  (Abnormal) Collected: 05/09/23 1526    Lab Status: Final result Specimen: Blood from Arm, Right Updated: 05/09/23 1603     Sodium 137 mmol/L      Potassium 3 8 mmol/L      Chloride 110 mmol/L      CO2 25 mmol/L      ANION GAP 2 mmol/L      BUN 7 mg/dL      Creatinine 0 78 mg/dL      Glucose 91 mg/dL      Calcium 9 2 mg/dL      AST 23 U/L      ALT 30 U/L      Alkaline Phosphatase 63 U/L      Total Protein 7 6 g/dL      Albumin 3 9 g/dL      Total Bilirubin 0 53 mg/dL      eGFR 111 ml/min/1 73sq m     Narrative:      [place] guidelines for Chronic Kidney Disease (CKD):   •  Stage 1 with normal or high GFR (GFR > 90 mL/min/1 73 square meters)  •  Stage 2 Mild CKD (GFR = 60-89 mL/min/1 73 square meters)  •  Stage 3A Moderate CKD (GFR = 45-59 mL/min/1 73 square meters)  •  Stage 3B Moderate CKD (GFR = 30-44 mL/min/1 73 square meters)  •  Stage 4 Severe CKD (GFR = 15-29 mL/min/1 73 square meters)  •  Stage 5 End Stage CKD (GFR <15 mL/min/1 73 square meters)  Note: GFR calculation is accurate only with a steady state creatinine    Lipase [[id number]]  (Abnormal) Collected: 05/09/23 1526    Lab Status: Final result Specimen: Blood from Arm, Right Updated: 05/09/23 1601     Lipase 65 u/L     Protime-INR [[id number]]  (Normal) Collected: 05/09/23 1526    Lab Status: Final result Specimen: Blood from Arm, Right Updated: 05/09/23 1555     Protime 12 2 seconds      INR 0 88    CBC and differential [[id number]]  (Abnormal) Collected: 05/09/23 1526    Lab Status: Final result Specimen: Blood from Arm, Right Updated: 05/09/23 1552     WBC 4 40 Thousand/uL      RBC 4 97 Million/uL      Hemoglobin 16 0 g/dL      Hematocrit 47 1 %      MCV 95 fL      MCH 32 2 pg      MCHC 34 0 g/dL      RDW 12 7 %      MPV 11 0 fL      Platelets 485 Thousands/uL      nRBC 0 /100 WBCs      Neutrophils Relative 44 %      Immat GRANS % 0 %      Lymphocytes Relative 43 %      Monocytes Relative 10 %      Eosinophils Relative 2 %      Basophils Relative 1 %      Neutrophils Absolute 1 95 Thousands/µL      Immature Grans Absolute 0 01 Thousand/uL      Lymphocytes Absolute 1 88 Thousands/µL      Monocytes Absolute 0 44 Thousand/µL      Eosinophils Absolute 0 09 Thousand/µL      Basophils Absolute 0 03 Thousands/µL                  CT abdomen pelvis with contrast    (Results Pending)         Procedures  POC Biliary US    Date/Time: 5/9/2023 3:42 PM  Performed by: Sheryle Charles, MD  Authorized by: Sheryle Charles, MD     Patient location:  ED  Performed by:  Resident  Procedure details:     Exam Type:  Diagnostic    Indications: upper right quadrant abdominal pain and epigastric pain      Assessment for:  Cholecystitis and cholelithiasis    Views obtained: gallbladder (transverse and longitudinal), liver, common bile duct and portal triad      Image quality: diagnostic      Image availability:  Images available in PACS  Findings:     Cholelithiasis: not identified      Common bile duct:  Normal    Gallbladder wall:  Normal    Pericholecystic fluid: not identified      Sonographic Lai's sign: negative    Interpretation:     Biliary ultrasound impressions: normal gallbladder ultrasound            ED Course  ED Course as of 05/09/23 1648   Tue May 09, 2023   1428 ECG per my independent interpretation: Normal rate, regular rhythm, no ectopy, right axis (Seen on prior), no ST elevations or depressions     1626 Lipase(!): 65  WNL   1627 Hemoglobin: 16 0  WNL   1645 Patient signed out prior to final disposition  Patient feels as though he is starting to feel drawl symptoms  Tremor noted  Will manage with Ativan  Patient agreeable with admission for further work-up of melena as well as help with discontinuing alcohol  I personally discussed with FRANKLIN Aceves at the [facility] detox unit and unfortunately patient cannot go there due to GI bleed  SBIRT 22yo+    Flowsheet Row Most Recent Value   Initial Alcohol Screen: US AUDIT-C     1  How often do you have a drink containing alcohol? 3 Filed at: 05/09/2023 1409   2  How many drinks containing alcohol do you have on a typical day you are drinking? 1 Filed at: 05/09/2023 1409   3a  Male UNDER 65: How often do you have five or more drinks on one occasion? 1 Filed at: 05/09/2023 1409   Audit-C Score 5 Filed at: 05/09/2023 1409   YANA: How many times in the past year have you    Used an illegal drug or used a prescription medication for non-medical reasons? Once or Twice Filed at: 05/09/2023 1409   DAST-10: In the past 12 months       1  Have you used drugs other than those required for medical reasons?  0 Filed "at: 05/09/2023 1409   2  Do you use more than one drug at a time? 0 Filed at: 05/09/2023 1409   3  Have you had medical problems as a result of your drug use (e g , memory loss, hepatitis, convulsions, bleeding, etc )? 0 Filed at: 05/09/2023 1409   4  Have you had \"blackouts\" or \"flashbacks\" as a result of drug use? YesNo 0 Filed at: 05/09/2023 1409   5  Do you ever feel bad or guilty about your drug use? 0 Filed at: 05/09/2023 1409   6  Does your spouse (or parent) ever complain about your involvement with drugs? 0 Filed at: 05/09/2023 1409   7  Have you neglected your family because of your use of drugs? 0 Filed at: 05/09/2023 1409   8  Have you engaged in illegal activities in order to obtain drugs? 0 Filed at: 05/09/2023 1409   9  Have you ever experienced withdrawal symptoms (felt sick) when you stopped taking drugs? 0 Filed at: 05/09/2023 1409   10  Are you always able to stop using drugs when you want to? 0 Filed at: 05/09/2023 1409   DAST-10 Score 0 Filed at: 05/09/2023 1409                Medical Decision Making  Patient is a 63-year-old male, with a history significant for COPD, lupus, alcoholism, who presents to the ED today due to a one month history of melena  There is associated nausea, nonbloody vomiting, 25 pound weight loss, and epigastric/right upper quadrant pain that radiates to the back over this period of time  This discomfort is exacerbated by eating and there are no clear remitting factors  Patient denies chest pain, dyspnea, fever, urinary symptoms, weakness, numbness  Notably, patient drinks one half of a fifth of liquor daily  Due to the pain, patient states that he stopped drinking as heavily 2 weeks ago but he has continued to drink over that 2-week  Patient states that he has had alcohol withdrawal before and he does not feel as though he has withdrawal symptoms at this time  Patient is interested in discontinuing alcohol    Patient's physical exam is notable for epigastric and " right upper quadrant tenderness to palpation, discoid rash on face, clear heart and lungs,  Point-of-care ultrasound revealed normal gallbladder wall thickness, no pericholecystic fluid, and normal width common bile duct  This presentation is concerning for: Anemia, gastritis, peptic ulcer disease, GI bleed, malignancy, pancreatitis, alcohol use disorder  Low clinical suspicion for biliary pathology based upon history and physical exam   Will investigate with CBC, CMP, lipase, type and screen, CT abdomen pelvis  Will manage with Protonix, Carafate, CIWA protocol, Zofran, vitamins, further based upon work-up  - No language barrier    - History obtained from patient  - There are no limitations to the history obtained  - External record review performed of previous charting was performed by me  - I independently reviewed and interpreted ordered labs, ECGs from this encounter   - Patient's medication regimen reviewed  - Patient's comorbidities factored into diagnosis considerations and disposition planning  Amount and/or Complexity of Data Reviewed  Labs: ordered  Decision-making details documented in ED Course  Radiology: ordered  Risk  OTC drugs  Prescription drug management  Disposition  Final diagnoses:   None     ED Disposition     None      Follow-up Information    None         Patient's Medications   Discharge Prescriptions    No medications on file     No discharge procedures on file  PDMP Review     None           ED Provider  Attending physically available and evaluated Ana Magana  DARY managed the patient along with the ED Attending      Electronically Signed by         Italo Zapien MD  05/09/23 9999

## 2023-05-10 VITALS
DIASTOLIC BLOOD PRESSURE: 69 MMHG | HEIGHT: 71 IN | BODY MASS INDEX: 21.98 KG/M2 | WEIGHT: 157 LBS | HEART RATE: 61 BPM | TEMPERATURE: 97.8 F | OXYGEN SATURATION: 93 % | RESPIRATION RATE: 16 BRPM | SYSTOLIC BLOOD PRESSURE: 131 MMHG

## 2023-05-10 LAB
ANION GAP SERPL CALCULATED.3IONS-SCNC: 2 MMOL/L (ref 4–13)
BUN SERPL-MCNC: 9 MG/DL (ref 5–25)
CALCIUM SERPL-MCNC: 8.9 MG/DL (ref 8.3–10.1)
CHLORIDE SERPL-SCNC: 110 MMOL/L (ref 96–108)
CO2 SERPL-SCNC: 25 MMOL/L (ref 21–32)
CREAT SERPL-MCNC: 0.83 MG/DL (ref 0.6–1.3)
ERYTHROCYTE [DISTWIDTH] IN BLOOD BY AUTOMATED COUNT: 12.7 % (ref 11.6–15.1)
GFR SERPL CREATININE-BSD FRML MDRD: 108 ML/MIN/1.73SQ M
GLUCOSE SERPL-MCNC: 98 MG/DL (ref 65–140)
HCT VFR BLD AUTO: 47.2 % (ref 36.5–49.3)
HGB BLD-MCNC: 16.4 G/DL (ref 12–17)
MCH RBC QN AUTO: 33.1 PG (ref 26.8–34.3)
MCHC RBC AUTO-ENTMCNC: 34.7 G/DL (ref 31.4–37.4)
MCV RBC AUTO: 95 FL (ref 82–98)
PLATELET # BLD AUTO: 134 THOUSANDS/UL (ref 149–390)
PMV BLD AUTO: 11.4 FL (ref 8.9–12.7)
POTASSIUM SERPL-SCNC: 4 MMOL/L (ref 3.5–5.3)
RBC # BLD AUTO: 4.95 MILLION/UL (ref 3.88–5.62)
SODIUM SERPL-SCNC: 137 MMOL/L (ref 135–147)
WBC # BLD AUTO: 4.6 THOUSAND/UL (ref 4.31–10.16)

## 2023-05-10 RX ORDER — ALBUTEROL SULFATE 90 UG/1
2 AEROSOL, METERED RESPIRATORY (INHALATION) EVERY 4 HOURS PRN
Status: DISCONTINUED | OUTPATIENT
Start: 2023-05-10 | End: 2023-05-10 | Stop reason: HOSPADM

## 2023-05-10 RX ORDER — PANTOPRAZOLE SODIUM 40 MG/10ML
40 INJECTION, POWDER, LYOPHILIZED, FOR SOLUTION INTRAVENOUS EVERY 12 HOURS
Status: DISCONTINUED | OUTPATIENT
Start: 2023-05-10 | End: 2023-05-10

## 2023-05-10 RX ORDER — ONDANSETRON 2 MG/ML
4 INJECTION INTRAMUSCULAR; INTRAVENOUS EVERY 6 HOURS PRN
Status: DISCONTINUED | OUTPATIENT
Start: 2023-05-10 | End: 2023-05-10 | Stop reason: HOSPADM

## 2023-05-10 RX ORDER — PANTOPRAZOLE SODIUM 40 MG/1
40 TABLET, DELAYED RELEASE ORAL
Qty: 30 TABLET | Refills: 0 | Status: SHIPPED | OUTPATIENT
Start: 2023-05-11

## 2023-05-10 RX ORDER — LORAZEPAM 2 MG/ML
2 INJECTION INTRAMUSCULAR ONCE
Status: COMPLETED | OUTPATIENT
Start: 2023-05-10 | End: 2023-05-10

## 2023-05-10 RX ORDER — PANTOPRAZOLE SODIUM 40 MG/1
40 TABLET, DELAYED RELEASE ORAL
Status: DISCONTINUED | OUTPATIENT
Start: 2023-05-11 | End: 2023-05-10 | Stop reason: HOSPADM

## 2023-05-10 RX ORDER — NICOTINE 21 MG/24HR
1 PATCH, TRANSDERMAL 24 HOURS TRANSDERMAL DAILY
Status: DISCONTINUED | OUTPATIENT
Start: 2023-05-10 | End: 2023-05-10 | Stop reason: HOSPADM

## 2023-05-10 RX ADMIN — LORAZEPAM 2 MG: 2 INJECTION INTRAMUSCULAR; INTRAVENOUS at 06:30

## 2023-05-10 RX ADMIN — Medication 1 TABLET: at 08:02

## 2023-05-10 RX ADMIN — THIAMINE HCL TAB 100 MG 100 MG: 100 TAB at 08:02

## 2023-05-10 RX ADMIN — FOLIC ACID 1 MG: 1 TABLET ORAL at 08:02

## 2023-05-10 RX ADMIN — NICOTINE 1 PATCH: 21 PATCH, EXTENDED RELEASE TRANSDERMAL at 08:02

## 2023-05-10 RX ADMIN — PANTOPRAZOLE SODIUM 40 MG: 40 INJECTION, POWDER, FOR SOLUTION INTRAVENOUS at 08:02

## 2023-05-10 NOTE — ASSESSMENT & PLAN NOTE
· Longstanding history of alcohol abuse of reported 1 gallon whiskey and unclear amount of beer daily, last drink per patient approximately 1700H 5/8/2023 and patient now reporting concerns for withdrawal symptoms  · EM resident discussed case with team at [facility] detox, cannot go there at this time pending further work-up of apparent GI bleed  · Continue CIWA protocol   · Continue thiamine/folate/multivitamin  · CM consult, additionally counseled on need for complete cessation from alcohol

## 2023-05-10 NOTE — ASSESSMENT & PLAN NOTE
· Patient reporting intermittent rectal bleeding for the past 1 month with reported episodes of melena, additionally reporting epigastric pain, decreased appetite, and reported at least 15 pound weight loss during this span    History of alcohol abuse and use of Advil is noted  · Hemoglobin stable compared to prior, CT abdomen/pelvis with colonic diverticulosis without evidence of colitis or diverticulitis and without other acute abnormality identified  · Admit for further evaluation of the reported bleeding  · GI consult, n p o  after midnight pending their evaluation  · Continue IV Protonix for now  · Avoid AP/AC  · Monitor hemoglobin intermittently

## 2023-05-10 NOTE — ASSESSMENT & PLAN NOTE
· Longstanding history of alcohol abuse of reported 1 gallon whiskey and unclear amount of beer daily, last drink per patient approximately 1700H 5/8/2023 and patient now reporting concerns for withdrawal symptoms  · The risks of alcohol withdrawal were discussed at length with the patient and his wife at bedside  These risks included development of symptoms such as tremulousness, diaphoresis, nausea, vomiting, headaches, lightheadedness or dizziness  We also discussed delirium tremens including seizures and hallucinations  I frankly discussed with them that this can be a life-threatening event and that my formal recommendation was that he go to [facility] detoxification unit for supervision during his withdrawal process  Patient stated that he wanted to return home  I could not get a clear picture from the patient whether he was going to pursue abstinence or continue drinking when he gets home  Nonetheless, I reiterated t the danger of alcohol withdrawal syndrome and delirium tremens, especially in light of the quantity and duration of his drinking habits  Information was provided for [facility] hospital should he develop any worsening symptoms at home  I advised him to return to the emergency room should he continue to abstain from alcohol and develop worsening symptoms or anson  Patient's wife and the patient were advised to call EMS should he develop a seizure

## 2023-05-10 NOTE — DISCHARGE SUMMARY
[address] [facility]  Discharge- Felton Shah 1980, 43 y o  male MRN: [id number]  [facility] Encounter: [id number]  Primary Care Provider: Joelle Strickland DO   Date and time admitted to hospital: 5/9/2023  2:11 PM    * Melena  Assessment & Plan  · Patient reporting intermittent rectal bleeding for the past 1 month with reported episodes of melena, additionally reporting epigastric pain, decreased appetite, and reported at least 15 pound weight loss during this span  History of alcohol abuse and use of Advil is noted  · CT of the abdomen/pelvis showing diverticulosis but no evidence of acute bleeding or other abnormality  · Hemoglobin stable around 15 to 16 g    · No evidence of active bleeding during hospitalization, hemoglobin has remained stable  · Evaluated by GI who deferred any further work-up with endoscopy  They recommended outpatient follow-up with colonoscopy  · We will continue on p o  Protonix 40 mg daily on discharge    Alcohol abuse with withdrawal ([facility] [address] )  Assessment & Plan  · Longstanding history of alcohol abuse of reported 1 gallon whiskey and unclear amount of beer daily, last drink per patient approximately 1700H 5/8/2023 and patient now reporting concerns for withdrawal symptoms  · The risks of alcohol withdrawal were discussed at length with the patient and his wife at bedside  These risks included development of symptoms such as tremulousness, diaphoresis, nausea, vomiting, headaches, lightheadedness or dizziness  We also discussed delirium tremens including seizures and hallucinations  I frankly discussed with them that this can be a life-threatening event and that my formal recommendation was that he go to [facility] detoxification unit for supervision during his withdrawal process  Patient stated that he wanted to return home    I could not get a clear picture from the patient whether he was going to pursue abstinence or continue drinking when he gets home  Nonetheless, I reiterated t the danger of alcohol withdrawal syndrome and delirium tremens, especially in light of the quantity and duration of his drinking habits  Information was provided for [facility] should he develop any worsening symptoms at home  I advised him to return to the emergency room should he continue to abstain from alcohol and develop worsening symptoms or anson  Patient's wife and the patient were advised to call EMS should he develop a seizure  Cigarette nicotine dependence without complication  Assessment & Plan  · Counseled on need for cessation  · Nicotine patch started, continue    Mild persistent asthma without complication  Assessment & Plan  · Not in acute exacerbation, continue as needed albuterol inhaler    Discoid lupus  Assessment & Plan  · Not currently on disease modifying medications  · Needs outpatient follow-up with PCP for further management      Discharging Physician / Practitioner: Flaco Stokes DO  PCP: Josselyn Rutledge DO  Admission Date:   Admission Orders (From admission, onward)     Ordered        05/09/23 2018  INPATIENT ADMISSION  Once                      Discharge Date: 05/10/23    Consultations During Hospital Stay:  · Gastroenterology    Procedures Performed:   · none    Significant Findings / Test Results:   · Melena  · Alcohol withdrawal    Incidental Findings:   · none    Test Results Pending at Discharge (will require follow up):   · none     Outpatient Tests Requested:  · none    Complications:  none    Reason for Admission: melena, etoh withdrawal    Hospital Course:   Emilia Preciado is a 43 y o  male patient who originally presented to the hospital on 5/9/2023 due to melena  He underwent a CT scan in the emergency room which was revealing only of diverticulosis without evidence of active high-volume bleed  His hemoglobin was stable throughout his hospital stay    He was kept overnight for GI evaluation "the next day  He was seen by the GI team on 5/10 who did not recommend any further endoscopic evaluation given the stability of his hemoglobin and lack of evidence of active bleeding  They recommended outpatient follow-up for colonoscopy  Otherwise patient was treated for alcohol withdrawal during his hospital stay  He reports heavy drinking as an outpatient approximately a gallon of hard liquor daily  The risks of alcohol withdrawal were discussed at length as stated above  Ultimately he did not want to stay in the hospital for alcohol withdrawal syndrome, nor did he want transfer to the medical detoxification unit at [facility]  He was discharged home with clear instructions to return to the emergency room should his clinical condition worsen especially if he continues to abstain from alcohol  Patient's wife was also present for the conversations  All questions and concerns were addressed  They expressed understanding  He was eager to return home  Please see above list of diagnoses and related plan for additional information  Condition at Discharge: good    Discharge Day Visit / Exam:   Subjective:  Patient seen and examined  Patient states he is feeling okay today  Acknowledges improvement in withdrawal symptoms with IV Ativan  Wants to go home since he is not getting any GI work-up  Deferred any further medical treatment for his alcohol withdrawal   Vitals: Blood Pressure: 131/69 (05/10/23 1131)  Pulse: 61 (05/10/23 0726)  Temperature: 97 8 °F (36 6 °C) (05/10/23 1131)  Temp Source: Temporal (05/09/23 1408)  Respirations: 16 (05/10/23 1131)  Height: 5' 11\" (180 3 cm) (05/09/23 2114)  Weight - Scale: 71 2 kg (157 lb) (05/09/23 2114)  SpO2: 93 % (05/10/23 0726)    PHYSICAL EXAM:    Vitals signs reviewed  Constitutional   Awake and cooperative  NAD  Head/Neck   Normocephalic  Atraumatic  HEENT   No scleral icterus  EOMI  discoid lupus rashes over the face     Heart   Regular rate and " rhythm  No murmers  Lungs   Clear to auscultation bilaterally  Respirations unlaboured  Abdomen   Soft  Nontender  Nondistended  Skin   Skin color normal  No rashes  Extremities   No deformities  No peripheral edema  Neuro   Alert and oriented  No new deficits  Psych   Mood stable  Affect normal          Discussion with Family: Updated  (wife) at bedside  Discharge instructions/Information to patient and family:   See after visit summary for information provided to patient and family  Provisions for Follow-Up Care:  See after visit summary for information related to follow-up care and any pertinent home health orders  Disposition:   Home    Planned Readmission: NO     Discharge Statement:  I spent 45 minutes discharging the patient  This time was spent on the day of discharge  I had direct contact with the patient on the day of discharge  Greater than 50% of the total time was spent examining patient, answering all patient questions, arranging and discussing plan of care with patient as well as directly providing post-discharge instructions  Additional time then spent on discharge activities  Discharge Medications:  See after visit summary for reconciled discharge medications provided to patient and/or family        **Please Note: This note may have been constructed using a voice recognition system**

## 2023-05-10 NOTE — PLAN OF CARE
Problem: PAIN - ADULT  Goal: Verbalizes/displays adequate comfort level or baseline comfort level  Description: Interventions:  - Encourage patient to monitor pain and request assistance  - Assess pain using appropriate pain scale  - Administer analgesics based on type and severity of pain and evaluate response  - Implement non-pharmacological measures as appropriate and evaluate response  - Consider cultural and social influences on pain and pain management  - Notify physician/advanced practitioner if interventions unsuccessful or patient reports new pain  Outcome: Progressing     Problem: INFECTION - ADULT  Goal: Absence or prevention of progression during hospitalization  Description: INTERVENTIONS:  - Assess and monitor for signs and symptoms of infection  - Monitor lab/diagnostic results  - Monitor all insertion sites, i e  indwelling lines, tubes, and drains  - Monitor endotracheal if appropriate and nasal secretions for changes in amount and color  - [place] appropriate cooling/warming therapies per order  - Administer medications as ordered  - Instruct and encourage patient and family to use good hand hygiene technique  - Identify and instruct in appropriate isolation precautions for identified infection/condition  Outcome: Progressing  Goal: Absence of fever/infection during neutropenic period  Description: INTERVENTIONS:  - Monitor WBC    Outcome: Progressing     Problem: Knowledge Deficit  Goal: Patient/family/caregiver demonstrates understanding of disease process, treatment plan, medications, and discharge instructions  Description: Complete learning assessment and assess knowledge base    Interventions:  - Provide teaching at level of understanding  - Provide teaching via preferred learning methods  Outcome: Progressing

## 2023-05-10 NOTE — DISCHARGE INSTR - AVS FIRST PAGE
During her hospitalization, we discussed extensively the risks of alcohol withdrawal without medical supervision in the outpatient setting  Given the amount of alcohol you consume on a daily basis, and the duration of your drinking you will be at high risk for severe withdrawal symptoms  Alcohol withdrawal symptoms are typically worst during days 2 through 7 after abstinence  He will be at risk for developing tremors, shaking, nausea or vomiting, headaches, diffuse sweating  In severe circumstances you can develop hallucinations and withdrawal seizures  This condition is known as delirium tremens and is a medical emergency  As discussed while you are hospitalized, we recommended transferring to the [facility] detoxification unit for medical supervision to safely treat your withdrawal symptoms  You deferred  If you continue to abstain from alcohol at home, and have worsening symptoms please return to the emergency room for further care  I would recommend going to the [facility] in [facility] as they have a detoxification unit, however any of the [place] [address] emergency room's will be able to care for you

## 2023-05-10 NOTE — ASSESSMENT & PLAN NOTE
· Patient reporting intermittent rectal bleeding for the past 1 month with reported episodes of melena, additionally reporting epigastric pain, decreased appetite, and reported at least 15 pound weight loss during this span  History of alcohol abuse and use of Advil is noted  · CT of the abdomen/pelvis showing diverticulosis but no evidence of acute bleeding or other abnormality  · Hemoglobin stable around 15 to 16 g    · No evidence of active bleeding during hospitalization, hemoglobin has remained stable  · Evaluated by GI who deferred any further work-up with endoscopy  They recommended outpatient follow-up with colonoscopy  · We will continue on p o   Protonix 40 mg daily on discharge

## 2023-05-10 NOTE — CASE MANAGEMENT
Case Management Assessment & Discharge Planning Note    Patient name Erlinda Moreno  Location [facility] [address] MRN [id number]  : 1980 Date 5/10/2023       Current Admission Date: 2023  Current Admission Diagnosis:Melena   Patient Active Problem List    Diagnosis Date Noted   • Melena 2023   • Alcohol abuse with withdrawal ([address] ) 2023   • Discoid lupus 2022   • Low oxygen saturation 2022   • Mild persistent asthma without complication    • Cigarette nicotine dependence without complication       LOS (days): 1  Geometric Mean LOS (GMLOS) (days):   Days to GMLOS:     OBJECTIVE:    Risk of Unplanned Readmission Score: 7 51         Current admission status: Inpatient       Preferred Pharmacy:   [address] [place] [address], [address] [facility] [address]  Phone: [phone] Fax: [phone]    Primary Care Provider: Marina Farah DO    Primary Insurance: Brian Verdin  Secondary Insurance: [facility]    ASSESSMENT:  Active Health Care Proxies    There are no active Health Care Proxies on file  Patient Information  Mental Status: Alert (Sleepy  Alert and answers all questions)  During Assessment patient was accompanied by: Not accompanied during assessment  Assessment information provided by[de-identified] Patient  Primary Caregiver: Self  Support Systems: Self, Spouse/significant other  Home entry access options   Select all that apply : Stairs  Number of steps to enter home :  (12 with railing)  Do the steps have railings?: Yes  Type of Current Residence: [address] [place] [address] home  Upon entering residence, is there a bedroom on the main floor (no further steps)?: Yes  Upon entering residence, is there a bathroom on the main floor (no further steps)?: Yes  In the last 12 months, was there a time when you were not able to pay the mortgage or rent on time?: No  In the last 12 months, was there a time when you did not have a steady place to sleep or slept in a shelter (including now)?: No  Homeless/housing insecurity resource given?: N/A  Living Arrangements: Lives w/ Spouse/significant other  Is patient a ?: No    Activities of Daily Living Prior to Admission  Functional Status: Independent  Completes ADLs independently?: Yes  Ambulates independently?: Yes  Does patient use assisted devices?: No  Does patient currently own DME?: Yes  What DME does the patient currently own?: [employer]  Does patient have a history of Outpatient Therapy (PT/OT)?: No  Does the patient have a history of Short-Term Rehab?: No  Does patient have a history of HHC?: No  Does patient currently have [address]?: No         Patient Information Continued  Income Source: Employed  Does patient have prescription coverage?: Yes  Within the past 12 months, you worried that your food would run out before you got the money to buy more : Never true  Within the past 12 months, the food you bought just didn't last and you didn't have money to get more : Never true  Food insecurity resource given?: N/A  Does patient receive dialysis treatments?: No  Does patient have a history of substance abuse?: Yes, Currently using (CM received consult for ETOH use  Patient states that he may interested in resources but does not want at this time wants to feel better first )  Does patient have a history of Mental Health Diagnosis?: No (per patient)         Means of Transportation  Means of Transport to Appts[de-identified] Drives Self  In the past 12 months, has lack of transportation kept you from medical appointments or from getting medications?: No  In the past 12 months, has lack of transportation kept you from meetings, work, or from getting things needed for daily living?: No  Was application for public transport provided?: N/A        DISCHARGE DETAILS:    CM met at bedside with patient, introduced self, educated on role and consult for ETOH use   Patient states that he may be interested in resources but not at this time as he wants to see how hospitalization goes and wants to feel better  States that wife will drive home at dc  CM was asked by provider to provide patient with information on [facility]  Information on facility printed, placed in envelope and provided to patient and wife at bedside  Educated on services and number to call

## 2023-05-10 NOTE — CONSULTS
Consult Service: Gastroenterology      PATIENT INFORMATION      Mikaela Hoovergood 43 y o  male MRN: [id number]  Unit/Bed#: [facility] 825-01 Encounter: [id number]  PCP: Kim Landers DO  Date of Admission:  5/9/2023  Date of Consultation: 05/10/23  Requesting Physician: Martin Rocha, *       ASSESSMENTS & PLAN     Dark colored stool  Possible Hematochezia VS Melena  · Patient has history of alcohol use disorder   · Patient presented with epigastric pain, nausea, dark colored stool and weight loss started one month ago  · Patient used to drink one gallon of whisky with beer daily and last drink was on 5/07  · Patient reported that he lost 15 lb in the last month  · Patient reported having red bloody stool alternating with black stool alternating with normal colored stool  · Patient is also complaining of early satiety and decreased appetite  · CT abdomen and pelvis showed colonic diverticula with no signs of colitis  · Patient hemoglobin is stable at 16  · Patient stool guaiac test is positive  · [place] Bleeding Score is 1  · [place] Score for Upper GI Bleeding is 0  · Recommended follow up outpatient with GI  · No plan for inpatient colonoscopy or EGD  · Recommended outpatient colonoscopy  · DC IV PPI and continue oral PPI  · Patient is good  For transfer to detox from the GI stand point  Epigastric pain  possible acute gastritis   · Patient has history of alcohol use disorder   · Patient presented with epigastric pain, nausea, dark colored stool and weight loss started one month ago  · Patient used to drink one gallon of whisky with beer daily and last drink was on 5/07  · Patient use Advil occasionally   · Patient reported that he lost 15 lb in the last month  · Recommended alcohol cessation  · Avoid NSAID  · Recommended continue oral PPI     Weight loss  possible in the setting of alcohol use disorder, malnutrition and decreased appetite    · Patient has history of alcohol use disorder   · Patient presented with epigastric pain, nausea, dark colored stool and weight loss started one month ago  · Patient used to drink one gallon of whisky with beer daily and last drink was on 5/07  · Patient reported that he lost 15 lb in the last month  · Recommended alcohol cessation  · Continue folic acid and thiamine    · Recommended outpatient nutrition consult           REASON FOR CONSULTATION      GI was consulted for possible Melena with epigastric pain and weigh loss      HISTORY OF PRESENT ILLNESS      Viv Roberts is a 43 y o  male with history of alcohol use disorder who presented with epigastric pain, nausea, dark colored stool and weight loss started one month ago  Patient used to drink one gallon of whisky with beer daily and last drink was on 5/07  Patient reported that he lost 15 lb in the last month  Patient reported having bloody stool alternating with normal colored stool  Patient is also complaining of early satiety and decreased appetite  Patient reported having facial rash as he has discoid lupus but not on any treatment  CT abdomen and pelvis showed colonic diverticula with no signs of colitis  Patient hemoglobin is stable at 16  Patient stool guaiac test is positive  During the interview patient was lying in the bed comfortable and pleasant with no acute distress  REVIEW OF SYSTEMS     A thorough 12-point review of systems has been conducted  Pertinent positives and negatives are mentioned in the history of present illness  PAST MEDICAL & SURGICAL HISTORY      Past Medical History:   Diagnosis Date   • COPD (chronic obstructive pulmonary disease) ([facility] [address] )        Past Surgical History:   Procedure Laterality Date   • CARDIAC SURGERY     • HEMORRHOID SURGERY  1988         MEDICATIONS & ALLERGIES       Medications:   Prior to Admission medications    Medication Sig Start Date End Date Taking?  Authorizing Provider   albuterol (PROVENTIL HFA,VENTOLIN HFA) "90 mcg/act inhaler Inhale 2 puffs every 4 (four) hours as needed for wheezing or shortness of breath 4/26/22  Yes ALYSSA Clayton   fluticasone-salmeterol (Advair Diskus) 100-50 mcg/dose inhaler Inhale 1 puff 2 (two) times a day Rinse mouth after use    Patient not taking: Reported on 7/30/2022 4/7/22 10/4/22  ALYSSA Clayton   lidocaine (LIDODERM) 5 % Apply 1 patch topically daily Remove & Discard patch within 12 hours or as directed by MD  Patient not taking: Reported on 7/30/2022 5/20/21   Rodriguez Ugarte PA-C   oxyCODONE (ROXICODONE) 5 mg immediate release tablet Take 1 tablet (5 mg total) by mouth every 6 (six) hours as needed for moderate pain or severe pain for up to 8 dosesMax Daily Amount: 20 mg  Patient not taking: No sig reported 5/20/21   Rodriguez Ugarte PA-C   triamcinolone (KENALOG) 0 1 % cream Apply topically 2 (two) times a day  Patient not taking: Reported on 7/30/2022 4/7/22   ALYSSA Clayton       Allergies: No Known Allergies      SOCIAL HISTORY      Marital Status: /Civil Union    Substance Use History:   Social History     Substance and Sexual Activity   Alcohol Use Not Currently     Social History     Tobacco Use   Smoking Status Every Day   Smokeless Tobacco Never     Social History     Substance and Sexual Activity   Drug Use Not Currently   • Types: Marijuana         FAMILY HISTORY      Non-Contributory      PHYSICAL EXAM     Vitals:   Blood Pressure: 138/69 (05/10/23 0726)  Pulse: 61 (05/10/23 0726)  Temperature: 98 °F (36 7 °C) (05/10/23 0726)  Temp Source: Temporal (05/09/23 1408)  Respirations: 18 (05/10/23 0726)  Height: 5' 11\" (180 3 cm) (05/09/23 2114)  Weight - Scale: 71 2 kg (157 lb) (05/09/23 2114)  SpO2: 93 % (05/10/23 0726)    Physical Exam:   GENERAL: NAD  HEENT:  NC/AT, no scleral icterus  CARDIAC:  RRR, +S1/S2  PULMONARY:  CTA B/L, no wheezing/rales/rhonci, non-labored breathing  ABDOMEN:  Soft, Mild tenderness over the epigastric " region, ND, +BS, no rebound/guarding/rigidity  Extremities:  No edema, cyanosis, or clubbing  NEUROLOGIC:  Alert  SKIN:  No rashes or erythema        ADDITIONAL DATA     Lab Results:     Results from last 7 days   Lab Units 05/10/23  0908 05/09/23  1526   WBC Thousand/uL 4 60 4 40   HEMOGLOBIN g/dL 16 4 16 0   HEMATOCRIT % 47 2 47 1   PLATELETS Thousands/uL 134* 132*   NEUTROS PCT %  --  44   LYMPHS PCT %  --  43   MONOS PCT %  --  10   EOS PCT %  --  2     Results from last 7 days   Lab Units 05/10/23  0908 05/09/23  1526   POTASSIUM mmol/L 4 0 3 8   CHLORIDE mmol/L 110* 110*   CO2 mmol/L 25 25   BUN mg/dL 9 7   CREATININE mg/dL 0 83 0 78   CALCIUM mg/dL 8 9 9 2   ALK PHOS U/L  --  63   ALT U/L  --  30   AST U/L  --  23     Results from last 7 days   Lab Units 05/09/23  1526   INR  0 88       Imaging:    CT abdomen pelvis with contrast    Result Date: 5/9/2023  Narrative: CT ABDOMEN AND PELVIS WITH IV CONTRAST INDICATION:   Epigastric pain Epigastric pain, weight loss, alcohol/tobacco use, bloody stool  Eval for mass lesions vs pancreatitis  COMPARISON:  None  TECHNIQUE:  CT examination of the abdomen and pelvis was performed  Multiplanar 2D reformatted images were created from the source data  Radiation dose length product (DLP) for this visit:  316 03 mGy-cm   This examination, like all CT scans performed in the [facility], was performed utilizing techniques to minimize radiation dose exposure, including the use of iterative  reconstruction and automated exposure control  IV Contrast:  100 mL of iohexol (OMNIPAQUE) Enteric Contrast:  Enteric contrast was not administered  FINDINGS: ABDOMEN LOWER CHEST:  No clinically significant abnormality identified in the visualized lower chest  LIVER/BILIARY TREE:  Unremarkable  GALLBLADDER:  No calcified gallstones  No pericholecystic inflammatory change  SPLEEN:  Unremarkable  PANCREAS:  Unremarkable  ADRENAL GLANDS:  Unremarkable   KIDNEYS/URETERS: Unremarkable  No hydronephrosis  STOMACH AND BOWEL: Limited evaluation of GI tract without oral contrast  Stomach unremarkable  Small bowel is average caliber  Colonic diverticulosis without evidence of colitis or diverticulitis  APPENDIX:  No findings to suggest appendicitis  ABDOMINOPELVIC CAVITY:  No ascites  No pneumoperitoneum  No lymphadenopathy  VESSELS:  Unremarkable for patient's age  PELVIS REPRODUCTIVE ORGANS:  Unremarkable for patient's age  URINARY BLADDER:  Unremarkable  ABDOMINAL WALL/INGUINAL REGIONS: Tiny fat-containing umbilical hernia  OSSEOUS STRUCTURES: Advanced degenerative changes with sclerosis involving L1-L2 and L5-S1     Impression: Limited evaluation of GI tract without oral contrast  Colonic diverticulosis without evidence of colitis or diverticulitis  Unremarkable pancreas  Workstation performed: [id number]     [address] [facility] bedside procedure    Result Date: 5/9/2023  Narrative: 1 2 840 [id number]  2 446 246 [id number] 10 1      EKG, Pathology, and Other Studies Reviewed on Admission:   · EKG: Reviewed      Counseling / Coordination of Care Time: 30 total mins spent n consult  Greater than 50% of total time spent on patient counseling and coordination of care     ** Please Note: This note is constructed using a voice recognition dictation system   **    Dinah Santiago DO PGY1  Internal Medicine resident

## 2023-05-10 NOTE — H&P
[facility]  H&P  Name: Diane Calderon 43 y o  male I MRN: [id number]  Unit/Bed#: [facility] I Date of Admission: 5/9/2023   Date of Service: 5/9/2023 I Hospital Day: 0      Assessment/Plan   * Melena  Assessment & Plan  · Patient reporting intermittent rectal bleeding for the past 1 month with reported episodes of melena, additionally reporting epigastric pain, decreased appetite, and reported at least 15 pound weight loss during this span    History of alcohol abuse and use of Advil is noted  · Hemoglobin stable compared to prior, CT abdomen/pelvis with colonic diverticulosis without evidence of colitis or diverticulitis and without other acute abnormality identified  · Admit for further evaluation of the reported bleeding  · GI consult, n p o  after midnight pending their evaluation  · Continue IV Protonix for now  · Avoid AP/AC  · Monitor hemoglobin intermittently    Alcohol abuse with withdrawal ([facility] [address] )  Assessment & Plan  · Longstanding history of alcohol abuse of reported 1 gallon whiskey and unclear amount of beer daily, last drink per patient approximately 1700H 5/8/2023 and patient now reporting concerns for withdrawal symptoms  · EM resident discussed case with team at [address] [place] [address] detox, cannot go there at this time pending further work-up of apparent GI bleed  · Continue CIWA protocol   · Continue thiamine/folate/multivitamin  · CM consult, additionally counseled on need for complete cessation from alcohol    Cigarette nicotine dependence without complication  Assessment & Plan  · Counseled on need for cessation  · Nicotine patch started, continue    Mild persistent asthma without complication  Assessment & Plan  · Not in acute exacerbation, continue as needed albuterol inhaler    Discoid lupus  Assessment & Plan  · Not currently on disease modifying medications  · Needs outpatient follow-up with PCP for further management         VTE Prophylaxis: Pharmacologic VTE "Prophylaxis contraindicated due to Reported GI bleeding  / sequential compression device   Code Status: Full code  POLST: POLST form is not discussed and not completed at this time  Discussion with family: Patient declines at this time    Anticipated Length of Stay:  Patient will be admitted on an Inpatient basis with an anticipated length of stay of greater than 2 midnights  Justification for Hospital Stay: Please see detailed plans noted above  Chief Complaint:     Rectal bleeding, concerns for alcohol withdrawal  History of Present Illness:  Irma Goss is a 43 y o  male who presented with concerns for rectal bleeding and alcohol withdrawal   He has a past medical history seen for alcohol abuse of reported 1 gallon of whiskey with unclear amount of beers daily with last drink reported approximately 1700H 5/8/2023, discoid lupus not currently on disease modifying medication, asthma, and nicotine dependence of approximately 1 pack/day  Patient states for the past 1 month he has noted intermittent episodes of rectal bleeding described as dark red and intermittently \"jet black,\" associated with decreased appetite and subsequent decreased oral intake, nausea without vomiting, and reported 15 pound weight loss over the span  He states that due to the degree of his symptoms he has been forced to somewhat decrease his amount of alcohol intake  He denied any fever/chills, recent travel, known sick contacts, chest pain/pressure, shortness of breath, or dizziness/lightheadedness  During ED evaluation revealed a stable hemoglobin from prior however mild thrombocytopenia noted  A CT abdomen/pelvis was obtained revealing colonic diverticulosis without complication and without other acute abnormality noted    Concerns were noted during ED evaluation for alcohol withdrawal thus patient was started on CIWA protocol, and EM resident discussed case with [address] [place] [address] detox on-call who declined patient at this " time due to need for work-up above GI bleed    He was started on a Protonix given concerns for melena and is admitted for further work-up of the GI bleed and management of the alcohol withdrawal     Review of Systems:    Constitutional:  Denies fever or chills but reported fatigue, weight loss, decreased appetite  Eyes:  Denies change in visual acuity   HENT:  Denies nasal congestion or sore throat   Respiratory:  Denies cough or shortness of breath   Cardiovascular:  Denies chest pain or edema   GI:  Denies vomiting, diarrhea but reported abdominal pain, nausea, bloody stools  :  Denies dysuria   Musculoskeletal:  Denies back pain or joint pain   Integument:  Denies rash   Neurologic:  Denies headache, focal weakness or sensory changes   Endocrine:  Denies polyuria or polydipsia   Lymphatic:  Denies swollen glands   Psychiatric:  Denies depression but reports anxiety    Past Medical and Surgical History:   Past Medical History:   Diagnosis Date   • COPD (chronic obstructive pulmonary disease) ([facility] [address] )      Past Surgical History:   Procedure Laterality Date   • CARDIAC SURGERY     • HEMORRHOID SURGERY  1988       Meds/Allergies:    Current Facility-Administered Medications:   •  folic acid (FOLVITE) tablet 1 mg, 1 mg, Oral, Daily, Kaylah Valdez MD, 1 mg at 05/09/23 1535  •  multivitamin-minerals (CENTRUM) tablet 1 tablet, 1 tablet, Oral, Daily, Kaylah Valdez MD, 1 tablet at 05/09/23 1535  •  nicotine (NICODERM CQ) 21 mg/24 hr TD 24 hr patch 21 mg, 21 mg, Transdermal, Once, Leni Beecham, DO, 21 mg at 05/09/23 1932  •  thiamine tablet 100 mg, 100 mg, Oral, Daily, Kaylah Valdez MD, 100 mg at 05/09/23 1535    Current Outpatient Medications:   •  albuterol (PROVENTIL HFA,VENTOLIN HFA) 90 mcg/act inhaler, Inhale 2 puffs every 4 (four) hours as needed for wheezing or shortness of breath, Disp: 18 g, Rfl: 1  •  fluticasone-salmeterol (Advair Diskus) 100-50 mcg/dose inhaler, Inhale 1 puff 2 (two) times a day Rinse mouth after use  (Patient not taking: Reported on 7/30/2022), Disp: 60 blister, Rfl: 5  •  lidocaine (LIDODERM) 5 %, Apply 1 patch topically daily Remove & Discard patch within 12 hours or as directed by MD (Patient not taking: Reported on 7/30/2022), Disp: 30 patch, Rfl: 0  •  oxyCODONE (ROXICODONE) 5 mg immediate release tablet, Take 1 tablet (5 mg total) by mouth every 6 (six) hours as needed for moderate pain or severe pain for up to 8 dosesMax Daily Amount: 20 mg (Patient not taking: No sig reported), Disp: 8 tablet, Rfl: 0  •  triamcinolone (KENALOG) 0 1 % cream, Apply topically 2 (two) times a day (Patient not taking: Reported on 7/30/2022), Disp: 30 g, Rfl: 0      Allergies: No Known Allergies  History:  Marital Status: /Civil Union     Substance Use History:   Social History     Substance and Sexual Activity   Alcohol Use Yes     Social History     Tobacco Use   Smoking Status Every Day   Smokeless Tobacco Never     Social History     Substance and Sexual Activity   Drug Use Yes   • Types: Marijuana       Family History:  Family History   Problem Relation Age of Onset   • Lupus Mother    • Heart disease Maternal Grandmother        Physical Exam:     Vitals:   Blood Pressure: 134/66 (05/09/23 1930)  Pulse: 55 (05/09/23 1930)  Temperature: 98 5 °F (36 9 °C) (05/09/23 1408)  Temp Source: Temporal (05/09/23 1408)  Respirations: 17 (05/09/23 1832)  SpO2: 94 % (05/09/23 1832)    Constitutional:  Well developed, well nourished, no acute distress, non-toxic appearance   Eyes:  PERRL, conjunctiva normal   HENT:  Atraumatic, external ears normal, nose normal, oropharynx moist, no pharyngeal exudates   Neck- normal range of motion, no tenderness, supple   Respiratory:  No respiratory distress, normal breath sounds, no rales, no wheezing   Cardiovascular:  Normal rate, normal rhythm, no murmurs, no gallops, no rubs   GI:  Soft, nondistended, normal bowel sounds, minimal epigastric discomfort to palpation, no organomegaly, no mass, no rebound, no guarding   :  No costovertebral angle tenderness   Musculoskeletal:  No edema, no tenderness, no deformities  Back- no tenderness  Integument:  Well hydrated, discoid plaques predominantly on left side of face  Lymphatic:  No lymphadenopathy noted   Neurologic:  Alert &awake, communicative, CN 2-12 normal, normal motor function, normal sensory function, no focal deficits noted, mild tremor with arms outstretched  Psychiatric:  Speech and behavior appropriate       Lab Results: I have personally reviewed pertinent reports  Results from last 7 days   Lab Units 05/09/23  1526   WBC Thousand/uL 4 40   HEMOGLOBIN g/dL 16 0   HEMATOCRIT % 47 1   PLATELETS Thousands/uL 132*   NEUTROS PCT % 44   LYMPHS PCT % 43   MONOS PCT % 10   EOS PCT % 2     Results from last 7 days   Lab Units 05/09/23  1526   POTASSIUM mmol/L 3 8   CHLORIDE mmol/L 110*   CO2 mmol/L 25   BUN mg/dL 7   CREATININE mg/dL 0 78   CALCIUM mg/dL 9 2   ALK PHOS U/L 63   ALT U/L 30   AST U/L 23     Results from last 7 days   Lab Units 05/09/23  1526   INR  0 88       EKG: Sinus bradycardia HR 59    Imaging: I have personally reviewed pertinent reports  CT abdomen pelvis with contrast    Result Date: 5/9/2023  Narrative: CT ABDOMEN AND PELVIS WITH IV CONTRAST INDICATION:   Epigastric pain Epigastric pain, weight loss, alcohol/tobacco use, bloody stool  Eval for mass lesions vs pancreatitis  COMPARISON:  None  TECHNIQUE:  CT examination of the abdomen and pelvis was performed  Multiplanar 2D reformatted images were created from the source data  Radiation dose length product (DLP) for this visit:  316 03 mGy-cm   This examination, like all CT scans performed in the [facility], was performed utilizing techniques to minimize radiation dose exposure, including the use of iterative  reconstruction and automated exposure control   IV Contrast:  100 mL of iohexol (OMNIPAQUE) Enteric Contrast:  Enteric contrast was not administered  FINDINGS: ABDOMEN LOWER CHEST:  No clinically significant abnormality identified in the visualized lower chest  LIVER/BILIARY TREE:  Unremarkable  GALLBLADDER:  No calcified gallstones  No pericholecystic inflammatory change  SPLEEN:  Unremarkable  PANCREAS:  Unremarkable  ADRENAL GLANDS:  Unremarkable  KIDNEYS/URETERS:  Unremarkable  No hydronephrosis  STOMACH AND BOWEL: Limited evaluation of GI tract without oral contrast  Stomach unremarkable  Small bowel is average caliber  Colonic diverticulosis without evidence of colitis or diverticulitis  APPENDIX:  No findings to suggest appendicitis  ABDOMINOPELVIC CAVITY:  No ascites  No pneumoperitoneum  No lymphadenopathy  VESSELS:  Unremarkable for patient's age  PELVIS REPRODUCTIVE ORGANS:  Unremarkable for patient's age  URINARY BLADDER:  Unremarkable  ABDOMINAL WALL/INGUINAL REGIONS: Tiny fat-containing umbilical hernia  OSSEOUS STRUCTURES: Advanced degenerative changes with sclerosis involving L1-L2 and L5-S1     Impression: Limited evaluation of GI tract without oral contrast  Colonic diverticulosis without evidence of colitis or diverticulitis  Unremarkable pancreas  Workstation performed: [id number]     [address] [facility] bedside procedure    Result Date: 5/9/2023  Narrative: 1 2 840 [id number]  2 446 [id number] 10 1        ** Please Note: Dragon 360 Dictation voice to text software was used in the creation of this document   **

## 2023-05-10 NOTE — UTILIZATION REVIEW
Initial Clinical Review    Admission: Date/Time/Statement:   Admission Orders (From admission, onward)     Ordered        05/09/23 2018  INPATIENT ADMISSION  Once                      Orders Placed This Encounter   Procedures   • INPATIENT ADMISSION     Standing Status:   Standing     Number of Occurrences:   1     Order Specific Question:   Level of Care     Answer:   Med Surg [16]     Order Specific Question:   Estimated length of stay     Answer:   More than 2 Midnights     Order Specific Question:   Certification     Answer:   I certify that inpatient services are medically necessary for this patient for a duration of greater than two midnights  See H&P and MD Progress Notes for additional information about the patient's course of treatment  ED Arrival Information     Expected   -    Arrival   5/9/2023 14:02    Acuity   Urgent            Means of arrival   Walk-In    Escorted by   Family Member    Service   Hospitalist    Admission type   Emergency            Arrival complaint   abdominal pain           Chief Complaint   Patient presents with   • Black or Bloody Stool     Pt began with dark stool about a month ago  Also having abdominal pain  Noted epigastric pain   + nausea, increases with eating  Initial Presentation: 43 y o  male who presented to [facility] ED admitted Inpatient status dt melena  PMHx:  COPD, ETOH abuse, lupus, asthma, tobacco use  Presented due to intermittent rectal bleeding for approx 1 mth and ETOH WD  Bleeding dark red and black, decreased appetite and po intake, nausea, weight loss of approx 15 lbs  Pt reports recent decrease in ETOH consumption but usually drinks one gallon of whiskey with unclear amount of beers daily with last drink reported approximately 5 pm on  5/8/2023  In the ED, labs revealed mild thrombocytopenia, CT AP revealed colonic diverticulosis without complication and without other acute abnormality noted    Concerns were noted during ED evaluation for alcohol withdrawal thus patient was started on CIWA protocol and given thiamine, folic acid and ativan  [facility] declined patient at this time due to need for work-up above GI bleed  He was started on a Protonix given concerns for melena  Plan:  med surg, continuous pulse ox, GI consult, NPO after MN, continue IV protonix, avoid AP/AC, monitor hgb, stool record at bedside, monitor IO  Continue CIWA, give thiamine/folate/multivitamin, CM consult for dispo planning  Date: 5/10   Day 2: Per GI:  CT AP showed colonic diverticula  Hgb is stable, stool guaiac test is positive  No plan for IP colonoscopy or EGD, fu outpt with GI  DC IV PPI and continue po PPI  Pt can be transferred to detox from GI standpoint  Recommend ETOH cessation, avoid NSAIDS, continue po PPI, folic acid and thiamine, outpt nutrition consult      ED Triage Vitals   Temperature Pulse Respirations Blood Pressure SpO2   05/09/23 1408 05/09/23 1410 05/09/23 1410 05/09/23 1410 05/09/23 1410   98 5 °F (36 9 °C) 68 18 130/78 94 %      Temp Source Heart Rate Source Patient Position - Orthostatic VS BP Location FiO2 (%)   05/09/23 1408 05/09/23 1832 05/09/23 1832 05/09/23 1832 --   Temporal Monitor Lying Right arm       Pain Score       05/09/23 1410       5          Wt Readings from Last 1 Encounters:   05/09/23 71 2 kg (157 lb)     Additional Vital Signs:   Date/Time Temp Pulse Resp BP MAP (mmHg) SpO2 O2 Device Patient Position - Orthostatic VS   05/10/23 07:26:42 98 °F (36 7 °C) 61 18 138/69 92 93 % -- --   05/10/23 05:58:56 -- 63 -- 140/85 103 98 % -- --   05/10/23 02:53:16 97 6 °F (36 4 °C) 63 16 123/63 83 98 % -- --   05/09/23 22:41:06 97 9 °F (36 6 °C) 60 16 125/63 84 94 % -- --   05/09/23 21:18:12 98 6 °F (37 °C) 61 14 128/61 83 95 % -- --   05/09/23 1930 -- 55 -- 134/66 -- -- -- --   05/09/23 1832 -- 61 17 109/70 83 94 % None (Room air) Lying   05/09/23 1830 -- 61 -- 109/70 -- -- -- --   05/09/23 1513 -- -- -- -- -- -- None (Room air) --   05/09/23 1410 -- 68 18 130/78 -- 94 % -- --   05/09/23 1408 98 5 °F (36 9 °C) -- -- -- -- -- -- --     Pertinent Labs/Diagnostic Test Results:  5/9 EKG:  Sinus bradycardia HR 59    CT abdomen pelvis with contrast   Final Result by Amelie Finn DO (05/09 1901)   Limited evaluation of GI tract without oral contrast  Colonic diverticulosis without evidence of colitis or diverticulitis  Unremarkable pancreas           Workstation performed: [id number]               Results from last 7 days   Lab Units 05/10/23  0908 05/09/23  1526   WBC Thousand/uL 4 60 4 40   HEMOGLOBIN g/dL 16 4 16 0   HEMATOCRIT % 47 2 47 1   PLATELETS Thousands/uL 134* 132*   NEUTROS ABS Thousands/µL  --  1 95         Results from last 7 days   Lab Units 05/10/23  0908 05/09/23  1526   SODIUM mmol/L 137 137   POTASSIUM mmol/L 4 0 3 8   CHLORIDE mmol/L 110* 110*   CO2 mmol/L 25 25   ANION GAP mmol/L 2* 2*   BUN mg/dL 9 7   CREATININE mg/dL 0 83 0 78   EGFR ml/min/1 73sq m 108 111   CALCIUM mg/dL 8 9 9 2     Results from last 7 days   Lab Units 05/09/23  1526   AST U/L 23   ALT U/L 30   ALK PHOS U/L 63   TOTAL PROTEIN g/dL 7 6   ALBUMIN g/dL 3 9   TOTAL BILIRUBIN mg/dL 0 53         Results from last 7 days   Lab Units 05/10/23  0908 05/09/23  1526   GLUCOSE RANDOM mg/dL 98 91     Results from last 7 days   Lab Units 05/09/23  1526   PROTIME seconds 12 2   INR  0 88     Results from last 7 days   Lab Units 05/09/23  1526   LIPASE u/L 65*     ED Treatment:   Medication Administration from 05/09/2023 1402 to 05/09/2023 2058       Date/Time Order Dose Route Action     05/09/2023 1535 EDT pantoprazole (PROTONIX) injection 40 mg 40 mg Intravenous Given     05/09/2023 1536 EDT ondansetron (ZOFRAN) injection 4 mg 4 mg Intravenous Given     05/09/2023 1535 EDT sucralfate (CARAFATE) tablet 1 g 1 g Oral Given     05/09/2023 1535 EDT thiamine tablet 100 mg 100 mg Oral Given     11/94/4766 1122 EDT folic acid (FOLVITE) tablet 1 mg 1 mg Oral Given     05/09/2023 1535 EDT multivitamin-minerals (CENTRUM) tablet 1 tablet 1 tablet Oral Given     05/09/2023 1702 EDT LORazepam (ATIVAN) injection 1 mg 1 mg Intravenous Given     05/09/2023 1723 EDT iohexol (OMNIPAQUE) 350 MG/ML injection (SINGLE-DOSE) 100 mL 100 mL Intravenous Given     05/09/2023 1932 EDT nicotine (NICODERM CQ) 21 mg/24 hr TD 24 hr patch 21 mg 21 mg Transdermal Medication Applied        Past Medical History:   Diagnosis Date   • COPD (chronic obstructive pulmonary disease) ([facility])      Present on Admission:  • Melena  • Discoid lupus  • Alcohol abuse with withdrawal ([facility])  • Mild persistent asthma without complication  • Cigarette nicotine dependence without complication      Admitting Diagnosis: [facility] ) [F10 939]  Melena [K92 1]  Abdominal pain [R10 9]  GI bleed [K92 2]  Alcohol abuse with withdrawal ([facility] ) [F10 139]  Age/Sex: 43 y o  male  Admission Orders:  Scheduled Medications:  folic acid, 1 mg, Oral, Daily  multivitamin-minerals, 1 tablet, Oral, Daily  nicotine, 1 patch, Transdermal, Daily  nicotine, 21 mg, Transdermal, Once  [START ON 5/11/2023] pantoprazole, 40 mg, Oral, Early Morning  thiamine, 100 mg, Oral, Daily      Continuous IV Infusions: none     PRN Meds:  albuterol, 2 puff, Inhalation, Q4H PRN  ondansetron, 4 mg, Intravenous, Q6H PRN      scd  Nicotine patch    IP CONSULT TO CASE MANAGEMENT  IP CONSULT TO GASTROENTEROLOGY    Network Utilization Review Department  ATTENTION: Please call with any questions or concerns to [phone] and carefully listen to the prompts so that you are directed to the right person  All voicemails are confidential   Dale Richards all requests for admission clinical reviews, approved or denied determinations and any other requests to dedicated fax number below belonging to the campus where the patient is receiving treatment   List of dedicated fax numbers for the Facilities:  FACILITY NAME UR FAX NUMBER   ADMISSION DENIALS (Administrative/Medical Necessity) [phone]   [address] (Maternity/NICU/Pediatrics) Kortney Soliman [address]  [phone]   [address] [facility] [address] Medical [place] [place] [address] [facility] [address] [facility] [place] [address] [phone]

## 2023-05-10 NOTE — ASSESSMENT & PLAN NOTE
· Not currently on disease modifying medications  · Needs outpatient follow-up with PCP for further management

## 2023-06-06 ENCOUNTER — TELEPHONE (OUTPATIENT)
Dept: OBGYN CLINIC | Facility: CLINIC | Age: 43
End: 2023-06-06

## 2023-06-06 NOTE — TELEPHONE ENCOUNTER
Patient was scheduled with Dr Andrea Fuelling this Thursday but he will be out of the office  I spoke to pt to reschedule due to provider out of office  I have no openings to offer the patient until next month   he would like for someone to call him to schedule sooner appt    [phone]     n/p, chronic back pain, no imaging, no surg, HM- L/M 2 CONFIRM

## 2023-06-09 ENCOUNTER — HOSPITAL ENCOUNTER (OUTPATIENT)
Dept: GASTROENTEROLOGY | Facility: HOSPITAL | Age: 43
Setting detail: OUTPATIENT SURGERY
End: 2023-06-09
Attending: INTERNAL MEDICINE
Payer: COMMERCIAL

## 2023-06-09 ENCOUNTER — ANESTHESIA EVENT (OUTPATIENT)
Dept: GASTROENTEROLOGY | Facility: HOSPITAL | Age: 43
End: 2023-06-09

## 2023-06-09 ENCOUNTER — ANESTHESIA (OUTPATIENT)
Dept: GASTROENTEROLOGY | Facility: HOSPITAL | Age: 43
End: 2023-06-09

## 2023-06-09 VITALS
DIASTOLIC BLOOD PRESSURE: 69 MMHG | RESPIRATION RATE: 16 BRPM | BODY MASS INDEX: 21.98 KG/M2 | TEMPERATURE: 96.9 F | OXYGEN SATURATION: 95 % | HEART RATE: 58 BPM | SYSTOLIC BLOOD PRESSURE: 117 MMHG | HEIGHT: 71 IN | WEIGHT: 157 LBS

## 2023-06-09 DIAGNOSIS — K92.1 MELENA: ICD-10-CM

## 2023-06-09 DIAGNOSIS — R63.4 ABNORMAL WEIGHT LOSS: ICD-10-CM

## 2023-06-09 DIAGNOSIS — R10.9 UNSPECIFIED ABDOMINAL PAIN: ICD-10-CM

## 2023-06-09 PROCEDURE — 88305 TISSUE EXAM BY PATHOLOGIST: CPT | Performed by: PATHOLOGY

## 2023-06-09 RX ORDER — ALBUTEROL SULFATE 90 UG/1
AEROSOL, METERED RESPIRATORY (INHALATION) AS NEEDED
Status: DISCONTINUED | OUTPATIENT
Start: 2023-06-09 | End: 2023-06-09

## 2023-06-09 RX ORDER — FENTANYL CITRATE 50 UG/ML
INJECTION, SOLUTION INTRAMUSCULAR; INTRAVENOUS AS NEEDED
Status: DISCONTINUED | OUTPATIENT
Start: 2023-06-09 | End: 2023-06-09

## 2023-06-09 RX ORDER — LIDOCAINE HYDROCHLORIDE 10 MG/ML
INJECTION, SOLUTION EPIDURAL; INFILTRATION; INTRACAUDAL; PERINEURAL AS NEEDED
Status: DISCONTINUED | OUTPATIENT
Start: 2023-06-09 | End: 2023-06-09

## 2023-06-09 RX ORDER — PROPOFOL 10 MG/ML
INJECTION, EMULSION INTRAVENOUS AS NEEDED
Status: DISCONTINUED | OUTPATIENT
Start: 2023-06-09 | End: 2023-06-09

## 2023-06-09 RX ORDER — SODIUM CHLORIDE 9 MG/ML
INJECTION, SOLUTION INTRAVENOUS CONTINUOUS PRN
Status: DISCONTINUED | OUTPATIENT
Start: 2023-06-09 | End: 2023-06-09

## 2023-06-09 RX ORDER — LORAZEPAM 1 MG/1
1 TABLET ORAL EVERY 8 HOURS PRN
COMMUNITY

## 2023-06-09 RX ADMIN — PROPOFOL 50 MG: 10 INJECTION, EMULSION INTRAVENOUS at 14:03

## 2023-06-09 RX ADMIN — FENTANYL CITRATE 50 MCG: 50 INJECTION, SOLUTION INTRAMUSCULAR; INTRAVENOUS at 13:49

## 2023-06-09 RX ADMIN — PROPOFOL 50 MG: 10 INJECTION, EMULSION INTRAVENOUS at 13:51

## 2023-06-09 RX ADMIN — ALBUTEROL SULFATE 2 PUFF: 90 AEROSOL, METERED RESPIRATORY (INHALATION) at 13:44

## 2023-06-09 RX ADMIN — PROPOFOL 100 MG: 10 INJECTION, EMULSION INTRAVENOUS at 13:49

## 2023-06-09 RX ADMIN — LIDOCAINE HYDROCHLORIDE 100 MG: 10 INJECTION, SOLUTION EPIDURAL; INFILTRATION; INTRACAUDAL; PERINEURAL at 13:49

## 2023-06-09 RX ADMIN — PROPOFOL 50 MG: 10 INJECTION, EMULSION INTRAVENOUS at 13:54

## 2023-06-09 RX ADMIN — PROPOFOL 50 MG: 10 INJECTION, EMULSION INTRAVENOUS at 14:06

## 2023-06-09 RX ADMIN — PROPOFOL 50 MG: 10 INJECTION, EMULSION INTRAVENOUS at 13:53

## 2023-06-09 RX ADMIN — PROPOFOL 50 MG: 10 INJECTION, EMULSION INTRAVENOUS at 13:57

## 2023-06-09 RX ADMIN — PROPOFOL 50 MG: 10 INJECTION, EMULSION INTRAVENOUS at 14:00

## 2023-06-09 RX ADMIN — SODIUM CHLORIDE: 0.9 INJECTION, SOLUTION INTRAVENOUS at 13:39

## 2023-06-09 RX ADMIN — PROPOFOL 50 MG: 10 INJECTION, EMULSION INTRAVENOUS at 13:59

## 2023-06-09 NOTE — ANESTHESIA PREPROCEDURE EVALUATION
Procedure:  COLONOSCOPY  EGD    Relevant Problems   ANESTHESIA (within normal limits)      CARDIO (within normal limits)      ENDO (within normal limits)      NEURO/PSYCH  currently drinking 4 beers per day      PULMONARY   (+) COPD (chronic obstructive pulmonary disease) (HCC)   (+) Mild persistent asthma without complication   (+) Smoking      Digestive   (+) Melena      Musculoskeletal and Integument   (+) Discoid lupus      Other   (+) Alcohol abuse with withdrawal (HCC)   (+) Cigarette nicotine dependence without complication   (+) Low oxygen saturation        Physical Exam    Airway    Mallampati score: II  TM Distance: >3 FB  Neck ROM: full     Dental       Cardiovascular      Pulmonary      Other Findings  Poor dentition  Many broken teeth and several missing teeth      Anesthesia Plan  ASA Score- 3     Anesthesia Type- IV sedation with anesthesia with ASA Monitors  Additional Monitors:   Airway Plan:           Plan Factors-Exercise tolerance (METS): >4 METS  Chart reviewed  EKG reviewed  Existing labs reviewed  Patient summary reviewed  Patient is a current smoker  Patient smoked on day of surgery  Induction-     Postoperative Plan-     Informed Consent- Anesthetic plan and risks discussed with patient  I personally reviewed this patient with the CRNA  Discussed and agreed on the Anesthesia Plan with the CRNA  Lino Romo

## 2023-06-09 NOTE — H&P
"History and Physical - [facility]  Joan Madden 43 y o  male MRN: [id number]                  HPI: Joan Madden is a 43y o  year old male who presents for EGD and colonoscopy  Indications for the procedures: Upper abdominal pain, melena, rectal bleeding  REVIEW OF SYSTEMS: Per the HPI, and otherwise unremarkable  Historical Information   Past Medical History:   Diagnosis Date   • COPD (chronic obstructive pulmonary disease) ([facility] )      Past Surgical History:   Procedure Laterality Date   • CARDIAC SURGERY     • HEMORRHOID SURGERY  80     Social History   Social History     Substance and Sexual Activity   Alcohol Use Yes   • Alcohol/week: 21 0 standard drinks of alcohol   • Types: 21 Cans of beer per week     Social History     Substance and Sexual Activity   Drug Use Not Currently   • Types: Marijuana     Social History     Tobacco Use   Smoking Status Every Day   Smokeless Tobacco Never     Family History   Problem Relation Age of Onset   • Lupus Mother    • Heart disease Maternal Grandmother        Meds/Allergies       Current Outpatient Medications:   •  albuterol (PROVENTIL HFA,VENTOLIN HFA) 90 mcg/act inhaler  •  LORazepam (ATIVAN) 1 mg tablet  •  pantoprazole (PROTONIX) 40 mg tablet  •  triamcinolone (KENALOG) 0 1 % cream  •  fluticasone-salmeterol (Advair Diskus) 100-50 mcg/dose inhaler  •  lidocaine (LIDODERM) 5 %  •  oxyCODONE (ROXICODONE) 5 mg immediate release tablet  No current facility-administered medications for this encounter      Facility-Administered Medications Ordered in Other Encounters:   •  sodium chloride 0 9 % infusion, , Intravenous, Continuous PRN, New Bag at 06/09/23 1339    No Known Allergies    Objective     /60   Pulse 95   Temp (!) 97 1 °F (36 2 °C) (Tympanic)   Resp 16   Ht 5' 11\" (1 803 m)   Wt 71 2 kg (157 lb)   SpO2 95%   BMI 21 90 kg/m²       PHYSICAL EXAM    Gen: NAD  Head: NCAT  CV: RRR  CHEST: Clear  ABD: soft, NT/ND  EXT: " no edema      ASSESSMENT/PLAN:  This is a 43y o  year old male here for EGD and colonoscopy, and he is stable and optimized for his procedure

## 2023-06-09 NOTE — ANESTHESIA POSTPROCEDURE EVALUATION
Post-Op Assessment Note    CV Status:  Stable    Pain management: adequate     Mental Status:  Alert and awake   Hydration Status:  Stable   PONV Controlled:  None   Airway Patency:  Adequate and patent      Post Op Vitals Reviewed: Yes      Staff: Anesthesiologist, CRNA         There were no known notable events for this encounter      /60 (06/09/23 1414)    Temp (!) 96 9 °F (36 1 °C) (06/09/23 1414)    Pulse 59 (06/09/23 1414)   Resp 16 (06/09/23 1414)    SpO2 95 % (06/09/23 1414)

## 2024-02-06 ENCOUNTER — HOSPITAL ENCOUNTER (EMERGENCY)
Facility: HOSPITAL | Age: 44
Discharge: HOME | End: 2024-02-07
Attending: EMERGENCY MEDICINE
Payer: COMMERCIAL

## 2024-02-06 VITALS
OXYGEN SATURATION: 99 % | HEART RATE: 81 BPM | RESPIRATION RATE: 20 BRPM | DIASTOLIC BLOOD PRESSURE: 74 MMHG | TEMPERATURE: 97.4 F | SYSTOLIC BLOOD PRESSURE: 133 MMHG

## 2024-02-06 DIAGNOSIS — S61.411A LACERATION OF RIGHT HAND: Primary | ICD-10-CM

## 2024-02-06 DIAGNOSIS — S69.91XA HAND INJURY, RIGHT, INITIAL ENCOUNTER: ICD-10-CM

## 2024-02-06 PROCEDURE — 99283 EMERGENCY DEPT VISIT LOW MDM: CPT | Performed by: EMERGENCY MEDICINE

## 2024-02-06 PROCEDURE — 99282 EMERGENCY DEPT VISIT SF MDM: CPT

## 2024-02-07 ENCOUNTER — APPOINTMENT (EMERGENCY)
Dept: RADIOLOGY | Facility: HOSPITAL | Age: 44
End: 2024-02-07
Payer: COMMERCIAL

## 2024-02-07 VITALS
RESPIRATION RATE: 15 BRPM | SYSTOLIC BLOOD PRESSURE: 138 MMHG | OXYGEN SATURATION: 95 % | TEMPERATURE: 97 F | HEART RATE: 80 BPM | DIASTOLIC BLOOD PRESSURE: 76 MMHG

## 2024-02-07 DIAGNOSIS — S60.221A CONTUSION OF RIGHT HAND, INITIAL ENCOUNTER: Primary | ICD-10-CM

## 2024-02-07 DIAGNOSIS — S61.411A LACERATION OF RIGHT HAND WITHOUT FOREIGN BODY, INITIAL ENCOUNTER: ICD-10-CM

## 2024-02-07 PROCEDURE — 99283 EMERGENCY DEPT VISIT LOW MDM: CPT

## 2024-02-07 PROCEDURE — 73130 X-RAY EXAM OF HAND: CPT

## 2024-02-07 PROCEDURE — 99284 EMERGENCY DEPT VISIT MOD MDM: CPT | Performed by: PHYSICIAN ASSISTANT

## 2024-02-07 RX ORDER — AMOXICILLIN AND CLAVULANATE POTASSIUM 875; 125 MG/1; MG/1
1 TABLET, FILM COATED ORAL EVERY 12 HOURS
Qty: 10 TABLET | Refills: 0 | Status: SHIPPED | OUTPATIENT
Start: 2024-02-07 | End: 2024-02-12

## 2024-02-07 RX ORDER — ACETAMINOPHEN 325 MG/1
975 TABLET ORAL ONCE
Status: COMPLETED | OUTPATIENT
Start: 2024-02-07 | End: 2024-02-07

## 2024-02-07 RX ADMIN — ACETAMINOPHEN 975 MG: 325 TABLET, FILM COATED ORAL at 11:35
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
